# Patient Record
Sex: FEMALE | Race: WHITE | Employment: UNEMPLOYED | URBAN - METROPOLITAN AREA
[De-identification: names, ages, dates, MRNs, and addresses within clinical notes are randomized per-mention and may not be internally consistent; named-entity substitution may affect disease eponyms.]

---
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
## 2021-01-26 ENCOUNTER — OFFICE VISIT (OUTPATIENT)
Dept: FAMILY MEDICINE CLINIC | Facility: CLINIC | Age: 21
End: 2021-01-26
Payer: COMMERCIAL

## 2021-01-26 VITALS
BODY MASS INDEX: 26.07 KG/M2 | DIASTOLIC BLOOD PRESSURE: 76 MMHG | OXYGEN SATURATION: 98 % | HEART RATE: 103 BPM | TEMPERATURE: 98 F | HEIGHT: 69 IN | WEIGHT: 176 LBS | SYSTOLIC BLOOD PRESSURE: 128 MMHG

## 2021-01-26 DIAGNOSIS — F41.0 ANXIETY ATTACK: Primary | ICD-10-CM

## 2021-01-26 PROCEDURE — 99213 OFFICE O/P EST LOW 20 MIN: CPT | Performed by: NURSE PRACTITIONER

## 2021-01-26 PROCEDURE — 3725F SCREEN DEPRESSION PERFORMED: CPT | Performed by: NURSE PRACTITIONER

## 2021-01-26 PROCEDURE — 3008F BODY MASS INDEX DOCD: CPT | Performed by: NURSE PRACTITIONER

## 2021-01-26 RX ORDER — HYDROXYZINE PAMOATE 25 MG/1
25 CAPSULE ORAL 3 TIMES DAILY PRN
Qty: 30 CAPSULE | Refills: 0 | Status: SHIPPED | OUTPATIENT
Start: 2021-01-26 | End: 2021-11-08 | Stop reason: ALTCHOICE

## 2021-01-26 NOTE — PROGRESS NOTES
Assessment/Plan:           Problem List Items Addressed This Visit        Other    Anxiety attack - Primary     Discussed with patient that she appearing to have a panic attack and gave prn vistaril and f/u with therapist          Relevant Medications    hydrOXYzine pamoate (VISTARIL) 25 mg capsule            Subjective:      Patient ID: Marline Martínez is a 24 y o  female  Patient here and reports that she had a panic attack yesterday at work and reports that she was having a good day and then she went in and had her temperature taken and felt hot and her boss was talking with her and she felt anxious and felt hard to breath and stepped away and went to the bathroom and reports that she had to leave and went home and was crying and anxious  Patient took some benadryl and took a while to collect herself  The following portions of the patient's history were reviewed and updated as appropriate:   She  has a past medical history of GERD (gastroesophageal reflux disease) and Medical history reviewed with no changes  She   Patient Active Problem List    Diagnosis Date Noted    Anxiety attack 01/26/2021    Upper respiratory tract infection 11/02/2020    Annual physical exam 05/12/2020     She  has a past surgical history that includes Eye surgery  Her family history includes ETHAN disease in her father; Glaucoma in her mother; Hyperlipidemia in her father and mother  She  reports that she has never smoked  She has never used smokeless tobacco  She reports that she does not drink alcohol or use drugs  Current Outpatient Medications   Medication Sig Dispense Refill    norethindrone-ethinyl estradiol-ferrous fumarate (LOESTIN 24 FE) 1-20 MG-MCG(24) per tablet Take 1 tablet by mouth daily      hydrOXYzine pamoate (VISTARIL) 25 mg capsule Take 1 capsule (25 mg total) by mouth 3 (three) times a day as needed for anxiety for up to 10 days 30 capsule 0     No current facility-administered medications for this visit  She is allergic to lac bovis       Review of Systems   Constitutional: Negative for activity change, appetite change, chills, diaphoresis, fatigue, fever and unexpected weight change  HENT: Negative for congestion, ear pain, hearing loss, postnasal drip, sinus pressure, sinus pain, sneezing and sore throat  Eyes: Negative for pain, redness and visual disturbance  Respiratory: Negative for cough and shortness of breath  Cardiovascular: Negative for chest pain and leg swelling  Gastrointestinal: Negative for abdominal pain, diarrhea, nausea and vomiting  Musculoskeletal: Negative for arthralgias  Neurological: Negative for dizziness and light-headedness  Psychiatric/Behavioral: Negative for behavioral problems and dysphoric mood  The patient is nervous/anxious  Objective:      /76   Pulse 103   Temp 98 °F (36 7 °C)   Ht 5' 9" (1 753 m)   Wt 79 8 kg (176 lb)   SpO2 98%   BMI 25 99 kg/m²          Physical Exam  Vitals signs reviewed  Constitutional:       General: She is not in acute distress  Appearance: She is well-developed  HENT:      Head: Normocephalic and atraumatic  Eyes:      Pupils: Pupils are equal, round, and reactive to light  Neck:      Musculoskeletal: Normal range of motion  Thyroid: No thyromegaly  Cardiovascular:      Rate and Rhythm: Normal rate and regular rhythm  Heart sounds: Normal heart sounds  No murmur  Pulmonary:      Effort: Pulmonary effort is normal  No respiratory distress  Breath sounds: Normal breath sounds  No wheezing  Abdominal:      General: Bowel sounds are normal       Palpations: Abdomen is soft  Musculoskeletal: Normal range of motion  Skin:     General: Skin is warm and dry  Neurological:      Mental Status: She is alert and oriented to person, place, and time

## 2021-01-26 NOTE — PROGRESS NOTES
Assessment/Plan:         There are no diagnoses linked to this encounter  Subjective:      Patient ID: Vy Dash is a 24 y o  female  HPI    The following portions of the patient's history were reviewed and updated as appropriate:   She  has a past medical history of GERD (gastroesophageal reflux disease) and Medical history reviewed with no changes  She   Patient Active Problem List    Diagnosis Date Noted    Upper respiratory tract infection 11/02/2020    Annual physical exam 05/12/2020     She  has a past surgical history that includes Eye surgery  Her family history includes ETHAN disease in her father; Glaucoma in her mother; Hyperlipidemia in her father and mother  She  reports that she has never smoked  She has never used smokeless tobacco  She reports that she does not drink alcohol or use drugs  Current Outpatient Medications   Medication Sig Dispense Refill    norethindrone-ethinyl estradiol-ferrous fumarate (LOESTIN 24 FE) 1-20 MG-MCG(24) per tablet Take 1 tablet by mouth daily       No current facility-administered medications for this visit  She is allergic to lac bovis       Review of Systems      Objective:      /76   Pulse 103   Temp 98 °F (36 7 °C)   Ht 5' 9" (1 753 m)   Wt 79 8 kg (176 lb)   SpO2 98%   BMI 25 99 kg/m²          Physical Exam        BMI Counseling: Body mass index is 25 99 kg/m²  The BMI is above normal  Nutrition recommendations include reducing portion sizes and 3-5 servings of fruits/vegetables daily

## 2021-04-05 ENCOUNTER — TELEMEDICINE (OUTPATIENT)
Dept: FAMILY MEDICINE CLINIC | Facility: CLINIC | Age: 21
End: 2021-04-05
Payer: COMMERCIAL

## 2021-04-05 ENCOUNTER — TELEPHONE (OUTPATIENT)
Dept: FAMILY MEDICINE CLINIC | Facility: CLINIC | Age: 21
End: 2021-04-05

## 2021-04-05 VITALS — BODY MASS INDEX: 26.07 KG/M2 | WEIGHT: 176 LBS | HEIGHT: 69 IN

## 2021-04-05 DIAGNOSIS — U07.1 COVID-19: Primary | ICD-10-CM

## 2021-04-05 PROCEDURE — 99441 PR PHYS/QHP TELEPHONE EVALUATION 5-10 MIN: CPT | Performed by: NURSE PRACTITIONER

## 2021-04-05 NOTE — TELEPHONE ENCOUNTER
Pt calls she tested [facility] yesterday at [facility]  Started showing sx on Thursday 4/1 - she said she was very very tired then progressed from there   She will need paperwork from her job completed,

## 2021-04-05 NOTE — PROGRESS NOTES
COVID-19 Outpatient Progress Note    Assessment/Plan:    Problem List Items Addressed This Visit        Other    COVID-19 - Primary     Positive for covid will touch base again with patient in a few days               Disposition:     I recommended self-quarantine for 10 days and to watch for symptoms until 14 days after exposure  If patient were to develop symptoms, they should self isolate and call our office for further guidance  I have spent 10 minutes directly with the patient  Greater than 50% of this time was spent in counseling/coordination of care regarding: risks and benefits of treatment options, instructions for management, patient and family education, importance of treatment compliance, risk factor reductions and impressions  Encounter provider MIRANDA Vargas    Provider located at [place] [address]  [address] [place] [phone]    Recent Visits  No visits were found meeting these conditions  Showing recent visits within past 7 days and meeting all other requirements     Today's Visits  Date Type Provider Dept   04/05/21 [address] MIRANDA [facility]   04/05/21 Telephone Lucas Hartman   Showing today's visits and meeting all other requirements     Future Appointments  No visits were found meeting these conditions  Showing future appointments within next 150 days and meeting all other requirements        Patient agrees to participate in a virtual check in via telephone or video visit instead of presenting to the office to address urgent/immediate medical needs  Patient is aware this is a billable service  After connecting through Telephone, the patient was identified by name and date of birth  Roscoe Frias was informed that this was a telemedicine visit and that the exam was being conducted confidentially over secure lines  My office door was closed  No one else was in the room   Mónica Pittman acknowledged consent and understanding of privacy and security of the telemedicine visit  I informed the patient that I have reviewed her record in Epic and presented the opportunity for her to ask any questions regarding the visit today  The patient agreed to participate  Subjective:   Amy A Vinton Romberg is a 24 y o  female who is concerned about COVID-19  Patient's symptoms include fever, chills, fatigue, malaise, nasal congestion, cough, myalgias and headache  Patient denies rhinorrhea, sore throat, anosmia, loss of taste, shortness of breath, chest tightness, abdominal pain, nausea, vomiting and diarrhea       Date of symptom onset: 4/1/2021  Date of exposure: 3/28/2021    Exposure:   Contact with a person who is under investigation (PUI) for or who is positive for COVID-19 within the last 14 days?: Yes    Hospitalized recently for fever and/or lower respiratory symptoms?: No      Currently a healthcare worker that is involved in direct patient care?: No      Works in a special setting where the risk of COVID-19 transmission may be high? (this may include long-term care, correctional and shelter facilities; homeless shelters; assisted-living facilities and group homes ): No      Resident in a special setting where the risk of COVID-19 transmission may be high? (this may include long-term care, correctional and shelter facilities; homeless shelters; assisted-living facilities and group homes ): No      Patient called out of work on 4/1/202 1had exposure on 3/28/2021    No results found for: [address] [facility] [address], [address] [facility], [address] [facility] [address]  Past Medical History:   Diagnosis Date    GERD (gastroesophageal reflux disease)     Medical history reviewed with no changes      Past Surgical History:   Procedure Laterality Date    EYE SURGERY      lazy eye     Current Outpatient Medications   Medication Sig Dispense Refill    norethindrone-ethinyl estradiol-ferrous fumarate (LOESTIN 24 FE) 1-20 MG-MCG(24) per tablet Take 1 tablet by mouth daily      hydrOXYzine pamoate (VISTARIL) 25 mg capsule Take 1 capsule (25 mg total) by mouth 3 (three) times a day as needed for anxiety for up to 10 days 30 capsule 0     No current facility-administered medications for this visit  Allergies   Allergen Reactions    Lac Bovis      Mom states hyper sensitive to milk products       Review of Systems   Constitutional: Positive for chills, fatigue and fever  HENT: Positive for congestion  Negative for rhinorrhea and sore throat  Respiratory: Positive for cough  Negative for chest tightness and shortness of breath  Gastrointestinal: Negative for abdominal pain, diarrhea, nausea and vomiting  Musculoskeletal: Positive for myalgias  Neurological: Positive for headaches  Objective:    Vitals:    04/05/21 1139   Weight: 79 8 kg (176 lb)   Height: 5' 9" (1 753 m)       Physical Exam  HENT:      Mouth/Throat:      Mouth: Mucous membranes are moist    Pulmonary:      Effort: Pulmonary effort is normal       Breath sounds: Normal breath sounds  Abdominal:      Palpations: Abdomen is soft  Musculoskeletal: Normal range of motion  Skin:     Capillary Refill: Capillary refill takes less than 2 seconds  Neurological:      Mental Status: She is alert and oriented to person, place, and time  Psychiatric:         Mood and Affect: Mood normal          Behavior: Behavior normal          Thought Content: Thought content normal          Judgment: Judgment normal        VIRTUAL VISIT DISCLAIMER    Mónica Alejandrotavo Bryanito acknowledges that she has consented to an online visit or consultation  She understands that the online visit is based solely on information provided by her, and that, in the absence of a face-to-face physical evaluation by the physician, the diagnosis she receives is both limited and provisional in terms of accuracy and completeness  This is not intended to replace a full medical face-to-face evaluation by the physician   Mónica KILGORE Des Regalado understands and accepts these terms

## 2021-04-08 ENCOUNTER — TELEMEDICINE (OUTPATIENT)
Dept: FAMILY MEDICINE CLINIC | Facility: CLINIC | Age: 21
End: 2021-04-08
Payer: COMMERCIAL

## 2021-04-08 DIAGNOSIS — U07.1 COVID-19: Primary | ICD-10-CM

## 2021-04-08 PROCEDURE — 99441 PR PHYS/QHP TELEPHONE EVALUATION 5-10 MIN: CPT | Performed by: PHYSICIAN ASSISTANT

## 2021-04-08 NOTE — PROGRESS NOTES
COVID-19 Outpatient Progress Note    Assessment/Plan:    Problem List Items Addressed This Visit        Other    COVID-19 - Primary         Disposition:     I recommended continued isolation until at least 24 hours have passed since recovery defined as resolution of fever without the use of fever-reducing medications AND improvement in COVID symptoms AND 10 days have passed since onset of symptoms (or 10 days have passed since date of first positive viral diagnostic test for asymptomatic patients)  I have spent 10 minutes directly with the patient  Encounter provider Adina Laureano PA-C    Provider located at [address]  [address] [place] [phone]    Recent Visits  Date Type Provider Dept   04/05/21 [address], [address]   04/05/21 Telephone Lucas Hartman   Showing recent visits within past 7 days and meeting all other requirements     Future Appointments  No visits were found meeting these conditions  Showing future appointments within next 150 days and meeting all other requirements        Patient agrees to participate in a virtual check in via telephone or video visit instead of presenting to the office to address urgent/immediate medical needs  Patient is aware this is a billable service  After connecting through Telephone, the patient was identified by name and date of birth  Yesica Soni was informed that this was a telemedicine visit and that the exam was being conducted confidentially over secure lines  My office door was closed  Mónica Durán acknowledged consent and understanding of privacy and security of the telemedicine visit  I informed the patient that I have reviewed her record in Epic and presented the opportunity for her to ask any questions regarding the visit today  The patient agreed to participate      It was my intent to perform this visit via video technology but the patient was not able to do a video connection so the visit was completed via audio telephone only  Subjective:   Amy A Vinton Romberg is a 24 y o  female who has been screened for COVID-19  Symptom change since last report: unchanged  Patient's symptoms include nasal congestion  Patient denies cough, shortness of breath and chest tightness  Mónica has been staying home and has isolated themselves in her home  She is taking care to not share personal items and is cleaning all surfaces that are touched often, like counters, tabletops, and doorknobs using household cleaning sprays or wipes  She is wearing a mask when she leaves her room  Date of symptom onset: 4/1/2021  Date of exposure: 3/28/2021  Date of positive COVID-19 PCR: 4/2/2021    25 y/o female day 9 of sx  Patient is at the end of her 10 day isolation period tomorrow  She has much improvement  Continues loss of smell and nasal congestion  Recommend patient to take OTC mucinex, flonase nasal spray and an OTC antihistamine  She marilyn SOB or fever  Lab Results   Component Value Date    SARSCOV2 Positive (A) 04/02/2021     Past Medical History:   Diagnosis Date    GERD (gastroesophageal reflux disease)     Medical history reviewed with no changes      Past Surgical History:   Procedure Laterality Date    EYE SURGERY      lazy eye     Current Outpatient Medications   Medication Sig Dispense Refill    hydrOXYzine pamoate (VISTARIL) 25 mg capsule Take 1 capsule (25 mg total) by mouth 3 (three) times a day as needed for anxiety for up to 10 days 30 capsule 0    norethindrone-ethinyl estradiol-ferrous fumarate (LOESTIN 24 FE) 1-20 MG-MCG(24) per tablet Take 1 tablet by mouth daily       No current facility-administered medications for this visit  Allergies   Allergen Reactions    Lac Bovis      Mom states hyper sensitive to milk products       Review of Systems   HENT: Positive for congestion      Respiratory: Negative for cough, chest tightness and shortness of breath  Objective: There were no vitals filed for this visit  Physical Exam  Constitutional:       General: She is not in acute distress  Neurological:      Mental Status: She is alert  VIRTUAL VISIT DISCLAIMER    Mónica Bach acknowledges that she has consented to an online visit or consultation  She understands that the online visit is based solely on information provided by her, and that, in the absence of a face-to-face physical evaluation by the physician, the diagnosis she receives is both limited and provisional in terms of accuracy and completeness  This is not intended to replace a full medical face-to-face evaluation by the physician  Mónica Godoy understands and accepts these terms

## 2021-04-08 NOTE — LETTER
April 8, 2021    Patient: Meri Blake  YOB: 2000  Date of Last Encounter: 4/8/2021      To whom it may concern:     Meri Blake has tested positive for COVID-19 (Coronavirus)  She may return to work on 04/12/2021, which is 10 days from illness onset (provided symptoms are improving) and 24 hours without fever      Sincerely,         Milagros Yung PA-C

## 2021-07-13 ENCOUNTER — OFFICE VISIT (OUTPATIENT)
Dept: FAMILY MEDICINE CLINIC | Facility: CLINIC | Age: 21
End: 2021-07-13
Payer: COMMERCIAL

## 2021-07-13 VITALS
HEIGHT: 69 IN | DIASTOLIC BLOOD PRESSURE: 76 MMHG | TEMPERATURE: 97 F | WEIGHT: 165.6 LBS | HEART RATE: 108 BPM | BODY MASS INDEX: 24.53 KG/M2 | OXYGEN SATURATION: 99 % | SYSTOLIC BLOOD PRESSURE: 124 MMHG

## 2021-07-13 DIAGNOSIS — R19.7 DIARRHEA, UNSPECIFIED TYPE: ICD-10-CM

## 2021-07-13 DIAGNOSIS — F41.0 ANXIETY ATTACK: Primary | ICD-10-CM

## 2021-07-13 PROCEDURE — 99214 OFFICE O/P EST MOD 30 MIN: CPT | Performed by: PHYSICIAN ASSISTANT

## 2021-07-13 PROCEDURE — 3008F BODY MASS INDEX DOCD: CPT | Performed by: PHYSICIAN ASSISTANT

## 2021-07-13 PROCEDURE — 1036F TOBACCO NON-USER: CPT | Performed by: PHYSICIAN ASSISTANT

## 2021-07-13 RX ORDER — FLUOXETINE 10 MG/1
10 CAPSULE ORAL DAILY
Qty: 30 CAPSULE | Refills: 0 | Status: SHIPPED | OUTPATIENT
Start: 2021-07-13 | End: 2021-08-10

## 2021-07-13 RX ORDER — DICYCLOMINE HYDROCHLORIDE 10 MG/1
10 CAPSULE ORAL
Qty: 21 CAPSULE | Refills: 2 | Status: SHIPPED | OUTPATIENT
Start: 2021-07-13 | End: 2021-11-08 | Stop reason: ALTCHOICE

## 2021-07-13 NOTE — PROGRESS NOTES
Assessment/Plan:    No problem-specific Assessment & Plan notes found for this encounter  Problem List Items Addressed This Visit        Other    Anxiety attack - Primary    Relevant Medications    FLUoxetine (PROzac) 10 mg capsule      Other Visit Diagnoses     Diarrhea, unspecified type        Relevant Medications    dicyclomine (Bentyl) 10 mg capsule            Patient tried Vistaril for anxiety without any relief  She continues to have anxiety  Appears to be less depression and more anxiety  She relates this also to having diarrhea  Bentyl as needed for the diarrhea  Will start on Prozac and follow-up in 4 weeks  Discussed medication adverse effects and benefit vs risks  Pt agreed to plan  Continue counseling therapy monthly    Subjective:      Patient ID: Jad Ruby is a 24 y o  female  23 y/o female presents for evaluation of anxiety  Pt reports she has had ongoing anxiety attacks over the last 6 months  Was placed on hydroxyzine without much relief  She reports associated diarrhea  States her stomach gets upset and has an immediate urge to have a bowel movement  She denies any associated abdominal pain, nausea, vomiting or blood in stool  She denies feeling sad  She denies SI/HI  Denies CP or SOB  The following portions of the patient's history were reviewed and updated as appropriate: allergies, current medications, past family history, past social history, past surgical history and problem list     Review of Systems   Constitutional: Negative for chills, fatigue and fever  HENT: Negative for congestion, ear pain, sinus pain, sore throat and trouble swallowing  Eyes: Negative for pain, discharge and redness  Respiratory: Negative for cough, chest tightness, shortness of breath and wheezing  Cardiovascular: Negative for chest pain, palpitations and leg swelling  Gastrointestinal: Negative for abdominal pain, diarrhea, nausea and vomiting     Musculoskeletal: Negative for arthralgias, joint swelling and myalgias  Skin: Negative for rash  Neurological: Negative for dizziness, weakness, numbness and headaches  Objective:      /76 (BP Location: Left arm, Patient Position: Sitting, Cuff Size: Standard)   Pulse (!) 108   Temp (!) 97 °F (36 1 °C)   Ht 5' 9" (1 753 m)   Wt 75 1 kg (165 lb 9 6 oz)   SpO2 99%   BMI 24 45 kg/m²          Physical Exam  Vitals and nursing note reviewed  Constitutional:       General: She is not in acute distress  Appearance: Normal appearance  She is well-developed  Cardiovascular:      Rate and Rhythm: Normal rate and regular rhythm  Pulmonary:      Effort: Pulmonary effort is normal       Breath sounds: Normal breath sounds  Abdominal:      General: Bowel sounds are normal       Palpations: Abdomen is soft  Abdomen is not rigid  Tenderness: There is no abdominal tenderness  There is no right CVA tenderness, left CVA tenderness, guarding or rebound  Negative signs include Stuart's sign and McBurney's sign  Hernia: No hernia is present  Skin:     General: Skin is warm and dry  Psychiatric:         Attention and Perception: Attention normal          Mood and Affect: Mood is anxious  Speech: Speech normal          Behavior: Behavior normal          Thought Content:  Thought content normal          Cognition and Memory: Cognition normal          Judgment: Judgment normal

## 2021-08-10 ENCOUNTER — TELEMEDICINE (OUTPATIENT)
Dept: FAMILY MEDICINE CLINIC | Facility: CLINIC | Age: 21
End: 2021-08-10
Payer: COMMERCIAL

## 2021-08-10 DIAGNOSIS — F41.0 ANXIETY ATTACK: ICD-10-CM

## 2021-08-10 DIAGNOSIS — F41.9 ANXIETY: Primary | ICD-10-CM

## 2021-08-10 PROCEDURE — 99441 PR PHYS/QHP TELEPHONE EVALUATION 5-10 MIN: CPT | Performed by: PHYSICIAN ASSISTANT

## 2021-08-10 RX ORDER — FLUOXETINE 10 MG/1
CAPSULE ORAL
Qty: 30 CAPSULE | Refills: 0 | Status: SHIPPED | OUTPATIENT
Start: 2021-08-10 | End: 2021-09-03

## 2021-08-10 NOTE — PROGRESS NOTES
Virtual Brief Visit    Verification of patient location:    Patient is located in the following state in which I hold an active license [place]      Assessment/Plan:    Problem List Items Addressed This Visit     None      Visit Diagnoses     Anxiety    -  Primary        F/u 4 weeks  Continue Prozac 10mg- discussed increasing to 20mg however patient would like to stay at the 10mg for now  Reason for visit is   Chief Complaint   Patient presents with    Anxiety     Patient stated that she is doing well on Prozac - her anxiety symptoms have slowed down alot - diarrhea is alot better, she has not experienced that in a few weeks   Virtual Brief Visit        Encounter provider Barry Tristan PA-C    Provider located at [place] [address]  [address] [place] [phone]    Recent Visits  No visits were found meeting these conditions  Showing recent visits within past 7 days and meeting all other requirements  Today's Visits  Date Type Provider Dept   08/10/21 Telemedicine Saulo Melody Holliday PA-C [facility] [address] today's visits and meeting all other requirements  Future Appointments  No visits were found meeting these conditions  Showing future appointments within next 150 days and meeting all other requirements       After connecting through telephone, the patient was identified by name and date of birth  Snehal Guzmán was informed that this is a telemedicine visit and that the visit is being conducted through telephone  My office door was closed  No one else was in the room  She acknowledged consent and understanding of privacy and security of the platform  The patient has agreed to participate and understands she can discontinue the visit at any time  Patient is aware this is a billable service  Sarwat Lezama Randall is a 22 y o  female for f/u on anxiety  23 y/o female presents for follow up on anxiety   Patient states overall doing very well on [id number]  Has much improvement on her mood as well as diarrhea and GI issues  She has no complaints today  Past Medical History:   Diagnosis Date    GERD (gastroesophageal reflux disease)     Medical history reviewed with no changes        Past Surgical History:   Procedure Laterality Date    EYE SURGERY      lazy eye       Current Outpatient Medications   Medication Sig Dispense Refill    dicyclomine (Bentyl) 10 mg capsule Take 1 capsule (10 mg total) by mouth 3 (three) times a day before meals 21 capsule 2    FLUoxetine (PROzac) 10 mg capsule Take 1 capsule (10 mg total) by mouth daily 30 capsule 0    hydrOXYzine pamoate (VISTARIL) 25 mg capsule Take 1 capsule (25 mg total) by mouth 3 (three) times a day as needed for anxiety for up to 10 days (Patient not taking: Reported on 7/13/2021) 30 capsule 0    norethindrone-ethinyl estradiol-ferrous fumarate (LOESTIN 24 FE) 1-20 MG-MCG(24) per tablet Take 1 tablet by mouth daily (Patient not taking: Reported on 7/13/2021)       No current facility-administered medications for this visit  Allergies   Allergen Reactions    Lac Bovis      Mom states hyper sensitive to milk products       Review of Systems   Constitutional: Negative for chills, fatigue and fever  HENT: Negative for congestion, ear pain, sinus pain, sore throat and trouble swallowing  Eyes: Negative for pain, discharge and redness  Respiratory: Negative for cough, chest tightness, shortness of breath and wheezing  Cardiovascular: Negative for chest pain, palpitations and leg swelling  Gastrointestinal: Negative for abdominal pain, diarrhea, nausea and vomiting  Musculoskeletal: Negative for arthralgias, joint swelling and myalgias  Skin: Negative for rash  Neurological: Negative for dizziness, weakness, numbness and headaches  Psychiatric/Behavioral: Negative for dysphoric mood and self-injury  The patient is nervous/anxious          There were no vitals filed for this visit  I spent 10 minutes directly with the patient during this visit    VIRTUAL VISIT DISCLAIMER      Mónica Ross verbally agrees to participate in [employer]  Pt is aware that [employer] could be limited without vital signs or the ability to perform a full hands-on physical exam  Mónica Ross understands she or the provider may request at any time to terminate the video visit and request the patient to seek care or treatment in person

## 2021-09-03 ENCOUNTER — TELEMEDICINE (OUTPATIENT)
Dept: FAMILY MEDICINE CLINIC | Facility: CLINIC | Age: 21
End: 2021-09-03
Payer: COMMERCIAL

## 2021-09-03 DIAGNOSIS — F41.9 ANXIETY: Primary | ICD-10-CM

## 2021-09-03 PROCEDURE — 99441 PR PHYS/QHP TELEPHONE EVALUATION 5-10 MIN: CPT | Performed by: PHYSICIAN ASSISTANT

## 2021-09-03 RX ORDER — FLUOXETINE 20 MG/1
20 TABLET, FILM COATED ORAL DAILY
Qty: 30 TABLET | Refills: 2 | Status: SHIPPED | OUTPATIENT
Start: 2021-09-03 | End: 2021-11-08 | Stop reason: ALTCHOICE

## 2021-09-03 NOTE — PROGRESS NOTES
Virtual Brief Visit    Verification of patient location:    Patient is located in the following state in which I hold an active license [place]      Assessment/Plan:    Problem List Items Addressed This Visit     None      Visit Diagnoses     Anxiety    -  Primary    Relevant Medications    FLUoxetine (PROzac) 20 MG tablet                Reason for visit is   Chief Complaint   Patient presents with    Virtual Brief Visit        Encounter provider Payal Colmenares PA-C    Provider located at [place] [address] [place] [id number]    Recent Visits  No visits were found meeting these conditions  Showing recent visits within past 7 days and meeting all other requirements  Today's Visits  Date Type Provider Dept   09/03/21 Telemedicine Suly Holliday PA-C [facility]   Showing today's visits and meeting all other requirements  Future Appointments  No visits were found meeting these conditions  Showing future appointments within next 150 days and meeting all other requirements       After connecting through telephone, the patient was identified by name and date of birth  Belia Richter was informed that this is a telemedicine visit and that the visit is being conducted through telephone  My office door was closed  No one else was in the room  She acknowledged consent and understanding of privacy and security of the platform  The patient has agreed to participate and understands she can discontinue the visit at any time  Patient is aware this is a billable service  Sarwat Nava Malvin is a 22 y o  female presents for anxiety f/u       21 y/o female presents for virtual f/u on anxiety  Pt taking 10mg prozac  States she was doing much better however since our last discussion she moved to [facility]  States has noticed her anxiety has increased however has not settled in yet  Denies SI/HI    Will increase prozac to 20mg   Continue virtual therapy sessions with therapist weekly  Past Medical History:   Diagnosis Date    GERD (gastroesophageal reflux disease)     Medical history reviewed with no changes        Past Surgical History:   Procedure Laterality Date    EYE SURGERY      lazy eye       Current Outpatient Medications   Medication Sig Dispense Refill    dicyclomine (Bentyl) 10 mg capsule Take 1 capsule (10 mg total) by mouth 3 (three) times a day before meals 21 capsule 2    FLUoxetine (PROzac) 20 MG tablet Take 1 tablet (20 mg total) by mouth daily 30 tablet 2    hydrOXYzine pamoate (VISTARIL) 25 mg capsule Take 1 capsule (25 mg total) by mouth 3 (three) times a day as needed for anxiety for up to 10 days (Patient not taking: Reported on 7/13/2021) 30 capsule 0    norethindrone-ethinyl estradiol-ferrous fumarate (LOESTIN 24 FE) 1-20 MG-MCG(24) per tablet Take 1 tablet by mouth daily (Patient not taking: Reported on 7/13/2021)       No current facility-administered medications for this visit  Allergies   Allergen Reactions    Lac Bovis      Mom states hyper sensitive to milk products       Review of Systems   Constitutional: Negative for chills, fatigue and fever  HENT: Negative for congestion, ear pain, sinus pain, sore throat and trouble swallowing  Eyes: Negative for pain, discharge and redness  Respiratory: Negative for cough, chest tightness, shortness of breath and wheezing  Cardiovascular: Negative for chest pain, palpitations and leg swelling  Gastrointestinal: Negative for abdominal pain, diarrhea, nausea and vomiting  Musculoskeletal: Negative for arthralgias, joint swelling and myalgias  Skin: Negative for rash  Neurological: Negative for dizziness, weakness, numbness and headaches  Psychiatric/Behavioral: Negative for dysphoric mood, self-injury and sleep disturbance  The patient is nervous/anxious  There were no vitals filed for this visit        I spent 5 minutes directly with the patient during this visit    VIRTUAL VISIT DISCLAIMER      Amy A Eloy Meigs verbally agrees to participate in [employer]  Pt is aware that [employer] could be limited without vital signs or the ability to perform a full hands-on physical exam  Amy A Eloy Meigs understands she or the provider may request at any time to terminate the video visit and request the patient to seek care or treatment in person

## 2021-11-05 ENCOUNTER — TELEPHONE (OUTPATIENT)
Dept: FAMILY MEDICINE CLINIC | Facility: CLINIC | Age: 21
End: 2021-11-05

## 2021-11-05 ENCOUNTER — TELEPHONE (OUTPATIENT)
Dept: PSYCHIATRY | Facility: CLINIC | Age: 21
End: 2021-11-05

## 2021-11-05 DIAGNOSIS — F41.0 ANXIETY ATTACK: Primary | ICD-10-CM

## 2021-11-05 DIAGNOSIS — F41.9 ANXIETY: ICD-10-CM

## 2021-11-08 ENCOUNTER — OFFICE VISIT (OUTPATIENT)
Dept: FAMILY MEDICINE CLINIC | Facility: CLINIC | Age: 21
End: 2021-11-08
Payer: COMMERCIAL

## 2021-11-08 VITALS
TEMPERATURE: 99.2 F | BODY MASS INDEX: 24.14 KG/M2 | WEIGHT: 163 LBS | DIASTOLIC BLOOD PRESSURE: 78 MMHG | HEART RATE: 92 BPM | SYSTOLIC BLOOD PRESSURE: 132 MMHG | HEIGHT: 69 IN | OXYGEN SATURATION: 98 %

## 2021-11-08 DIAGNOSIS — F41.9 ANXIETY: Primary | ICD-10-CM

## 2021-11-08 DIAGNOSIS — F41.0 ANXIETY ATTACK: ICD-10-CM

## 2021-11-08 PROBLEM — U07.1 COVID-19: Status: RESOLVED | Noted: 2021-04-05 | Resolved: 2021-11-08

## 2021-11-08 PROBLEM — J06.9 UPPER RESPIRATORY TRACT INFECTION: Status: RESOLVED | Noted: 2020-11-02 | Resolved: 2021-11-08

## 2021-11-08 PROBLEM — Z00.00 ANNUAL PHYSICAL EXAM: Status: RESOLVED | Noted: 2020-05-12 | Resolved: 2021-11-08

## 2021-11-08 PROCEDURE — 3725F SCREEN DEPRESSION PERFORMED: CPT | Performed by: NURSE PRACTITIONER

## 2021-11-08 PROCEDURE — 3008F BODY MASS INDEX DOCD: CPT | Performed by: NURSE PRACTITIONER

## 2021-11-08 PROCEDURE — 1036F TOBACCO NON-USER: CPT | Performed by: NURSE PRACTITIONER

## 2021-11-08 PROCEDURE — 99214 OFFICE O/P EST MOD 30 MIN: CPT | Performed by: NURSE PRACTITIONER

## 2021-11-08 RX ORDER — ESCITALOPRAM OXALATE 5 MG/1
5 TABLET ORAL DAILY
Qty: 30 TABLET | Refills: 0 | Status: SHIPPED | OUTPATIENT
Start: 2021-11-08 | End: 2021-11-16 | Stop reason: SDUPTHER

## 2021-11-08 RX ORDER — LORAZEPAM 0.5 MG/1
0.5 TABLET ORAL EVERY 8 HOURS PRN
Qty: 10 TABLET | Refills: 0 | Status: SHIPPED | OUTPATIENT
Start: 2021-11-08 | End: 2021-12-08

## 2021-11-16 ENCOUNTER — OFFICE VISIT (OUTPATIENT)
Dept: PSYCHIATRY | Facility: CLINIC | Age: 21
End: 2021-11-16
Payer: COMMERCIAL

## 2021-11-16 DIAGNOSIS — F41.9 ANXIETY: ICD-10-CM

## 2021-11-16 DIAGNOSIS — F41.0 GENERALIZED ANXIETY DISORDER WITH PANIC ATTACKS: Primary | ICD-10-CM

## 2021-11-16 DIAGNOSIS — F41.1 GENERALIZED ANXIETY DISORDER WITH PANIC ATTACKS: Primary | ICD-10-CM

## 2021-11-16 DIAGNOSIS — F41.0 ANXIETY ATTACK: ICD-10-CM

## 2021-11-16 PROCEDURE — 90792 PSYCH DIAG EVAL W/MED SRVCS: CPT

## 2021-11-16 RX ORDER — BUSPIRONE HYDROCHLORIDE 5 MG/1
5 TABLET ORAL 3 TIMES DAILY
Qty: 90 TABLET | Refills: 0 | Status: SHIPPED | OUTPATIENT
Start: 2021-11-16 | End: 2021-12-15 | Stop reason: SDUPTHER

## 2021-11-16 RX ORDER — ESCITALOPRAM OXALATE 10 MG/1
10 TABLET ORAL DAILY
Qty: 30 TABLET | Refills: 0 | Status: SHIPPED | OUTPATIENT
Start: 2021-11-16 | End: 2021-12-15 | Stop reason: SDUPTHER

## 2021-12-15 DIAGNOSIS — F41.1 GENERALIZED ANXIETY DISORDER WITH PANIC ATTACKS: ICD-10-CM

## 2021-12-15 DIAGNOSIS — F41.0 GENERALIZED ANXIETY DISORDER WITH PANIC ATTACKS: ICD-10-CM

## 2021-12-15 DIAGNOSIS — F41.9 ANXIETY: ICD-10-CM

## 2021-12-15 RX ORDER — ESCITALOPRAM OXALATE 10 MG/1
10 TABLET ORAL DAILY
Qty: 30 TABLET | Refills: 0 | Status: SHIPPED | OUTPATIENT
Start: 2021-12-15 | End: 2022-01-17 | Stop reason: SDUPTHER

## 2021-12-15 RX ORDER — BUSPIRONE HYDROCHLORIDE 5 MG/1
5 TABLET ORAL 3 TIMES DAILY
Qty: 90 TABLET | Refills: 0 | Status: SHIPPED | OUTPATIENT
Start: 2021-12-15 | End: 2022-01-17 | Stop reason: SDUPTHER

## 2021-12-22 ENCOUNTER — TELEMEDICINE (OUTPATIENT)
Dept: PSYCHIATRY | Facility: CLINIC | Age: 21
End: 2021-12-22
Payer: COMMERCIAL

## 2021-12-22 DIAGNOSIS — F41.0 GENERALIZED ANXIETY DISORDER WITH PANIC ATTACKS: Primary | ICD-10-CM

## 2021-12-22 DIAGNOSIS — F41.1 GENERALIZED ANXIETY DISORDER WITH PANIC ATTACKS: Primary | ICD-10-CM

## 2021-12-22 PROCEDURE — 1036F TOBACCO NON-USER: CPT

## 2021-12-22 PROCEDURE — 99213 OFFICE O/P EST LOW 20 MIN: CPT

## 2021-12-22 RX ORDER — ESCITALOPRAM OXALATE 5 MG/1
5 TABLET ORAL DAILY
Qty: 30 TABLET | Refills: 0 | Status: SHIPPED | OUTPATIENT
Start: 2021-12-22 | End: 2022-01-17 | Stop reason: SDUPTHER

## 2022-01-17 DIAGNOSIS — F41.1 GENERALIZED ANXIETY DISORDER WITH PANIC ATTACKS: ICD-10-CM

## 2022-01-17 DIAGNOSIS — F41.9 ANXIETY: ICD-10-CM

## 2022-01-17 DIAGNOSIS — F41.0 GENERALIZED ANXIETY DISORDER WITH PANIC ATTACKS: ICD-10-CM

## 2022-01-17 RX ORDER — ESCITALOPRAM OXALATE 5 MG/1
5 TABLET ORAL DAILY
Qty: 30 TABLET | Refills: 0 | Status: SHIPPED | OUTPATIENT
Start: 2022-01-17 | End: 2022-02-11

## 2022-01-17 RX ORDER — ESCITALOPRAM OXALATE 10 MG/1
10 TABLET ORAL DAILY
Qty: 30 TABLET | Refills: 0 | Status: SHIPPED | OUTPATIENT
Start: 2022-01-17 | End: 2022-02-11

## 2022-01-17 RX ORDER — BUSPIRONE HYDROCHLORIDE 5 MG/1
5 TABLET ORAL 3 TIMES DAILY
Qty: 90 TABLET | Refills: 0 | Status: SHIPPED | OUTPATIENT
Start: 2022-01-17 | End: 2022-02-11

## 2022-02-02 ENCOUNTER — TELEPHONE (OUTPATIENT)
Dept: GASTROENTEROLOGY | Facility: CLINIC | Age: 22
End: 2022-02-02

## 2022-02-02 NOTE — TELEPHONE ENCOUNTER
Spoke with patient  Unable to advise patient has not been seen since 2019  She will follow-up with her PCP for now  If a sooner appointment become available please schedule patient  Thank you

## 2022-02-02 NOTE — TELEPHONE ENCOUNTER
Mary Davey pt-  Patient is experiencing ibs symptoms, nausea/consitpation     She has scheduled a virtual appt  03/08 with Mary Davey     She is also going to reach out to her PCP  Joao Hendricks Please phone [phone] to advise  Joao Hendricks

## 2022-02-10 DIAGNOSIS — F41.9 ANXIETY: ICD-10-CM

## 2022-02-10 DIAGNOSIS — F41.0 GENERALIZED ANXIETY DISORDER WITH PANIC ATTACKS: ICD-10-CM

## 2022-02-10 DIAGNOSIS — F41.1 GENERALIZED ANXIETY DISORDER WITH PANIC ATTACKS: ICD-10-CM

## 2022-02-11 RX ORDER — ESCITALOPRAM OXALATE 10 MG/1
TABLET ORAL
Qty: 30 TABLET | Refills: 0 | Status: SHIPPED | OUTPATIENT
Start: 2022-02-11 | End: 2022-03-15

## 2022-02-11 RX ORDER — ESCITALOPRAM OXALATE 5 MG/1
5 TABLET ORAL DAILY
Qty: 30 TABLET | Refills: 0 | Status: SHIPPED | OUTPATIENT
Start: 2022-02-11 | End: 2022-03-15

## 2022-02-11 RX ORDER — BUSPIRONE HYDROCHLORIDE 5 MG/1
TABLET ORAL
Qty: 90 TABLET | Refills: 0 | Status: SHIPPED | OUTPATIENT
Start: 2022-02-11 | End: 2022-03-15

## 2022-02-23 ENCOUNTER — TELEMEDICINE (OUTPATIENT)
Dept: PSYCHIATRY | Facility: CLINIC | Age: 22
End: 2022-02-23
Payer: COMMERCIAL

## 2022-02-23 DIAGNOSIS — F41.1 GENERALIZED ANXIETY DISORDER WITH PANIC ATTACKS: Primary | ICD-10-CM

## 2022-02-23 DIAGNOSIS — F41.0 GENERALIZED ANXIETY DISORDER WITH PANIC ATTACKS: Primary | ICD-10-CM

## 2022-02-23 DIAGNOSIS — F41.0 ANXIETY ATTACK: ICD-10-CM

## 2022-02-23 PROCEDURE — 99213 OFFICE O/P EST LOW 20 MIN: CPT

## 2022-02-23 PROCEDURE — 1036F TOBACCO NON-USER: CPT

## 2022-02-23 NOTE — PSYCH
Virtual Regular Visit    Verification of patient location:    Patient is located in the following state in which I hold an active license [place]    Problem List Items Addressed This Visit        Other    Anxiety attack      Other Visit Diagnoses     Generalized anxiety disorder with panic attacks    -  Primary             Encounter provider MIRANDA Ramirez    Provider located at   [address] [address] [phone]    Recent Visits  No visits were found meeting these conditions  Showing recent visits within past 7 days and meeting all other requirements  Today's Visits  Date Type Provider Dept   02/23/22 Telemedicine MIRANDA Ramirez  [facility]   Showing today's visits and meeting all other requirements  Future Appointments  No visits were found meeting these conditions  Showing future appointments within next 150 days and meeting all other requirements         The patient was identified by name and date of birth  Diya Harrington was informed that this is a telemedicine visit and that the visit is being conducted through[employer] and patient was informed this is a secure, HIPAA-complaint platform  She agrees to proceed     My office door was closed  No one else was in the room  She acknowledged consent and understanding of privacy and security of the video platform  The patient has agreed to participate and understands they can discontinue the visit at any time  Patient is aware this is a billable service       HPI     Current Outpatient Medications   Medication Sig Dispense Refill    Aurovela 24 FE 1-20 MG-MCG(24) per tablet Take 1 tablet by mouth daily      busPIRone (BUSPAR) 5 mg tablet TAKE 1 TABLET BY MOUTH THREE TIMES A DAY 90 tablet 0    escitalopram (LEXAPRO) 10 mg tablet TAKE 1 TABLET BY MOUTH EVERY DAY 30 tablet 0    escitalopram (LEXAPRO) 5 mg tablet TAKE 1 TABLET (5 MG TOTAL) BY MOUTH DAILY  30 tablet 0    LORazepam (ATIVAN) 0 5 mg tablet Take 1 tablet (0 5 mg total) by mouth every 8 (eight) hours as needed for anxiety 10 tablet 0     No current facility-administered medications for this visit  Review of Systems  Video Exam    There were no vitals filed for this visit  Physical Exam   As a result of this visit, I have referred the patient for further respiratory evaluation  No    I spent 15 minutes directly with the patient during this visit  [address] acknowledges that she has consented to an online visit or consultation  She understands that the online visit is based solely on information provided by her, and that, in the absence of a face-to-face physical evaluation by the physician, the diagnosis she receives is both limited and provisional in terms of accuracy and completeness  This is not intended to replace a full medical face-to-face evaluation by the physician  Mónica Godoy understands and accepts these terms  MEDICATION MANAGEMENT NOTE        [address] [place] [address]    Name and Date of Birth:  Ryder Hunter y o  2000 MRN: [id number]    Date of Visit: February 23, 2022    Allergies   Allergen Reactions    Lac Bovis      Mom states hyper sensitive to milk products     SUBJECTIVE:    Mónica is seen today for a follow up for anxiety and Generalized Anxiety Disorder  She states that she continues to do very well since the last visit  Patient states she is doing extremely well on Lexapro and BuSpar for the management of generalized anxiety with panic attacks  She is happy to report she has not had a panic attack since November where she initially started treatment with provider  Previously experienced them almost on a daily basis  She is extremely pleased with her progress and there is no need for medication adjustments at this time    She has noticed her grades at college have much improved with her anxiety being properly managed  She has no other questions or concerns at this time and denies all side effects from medication  Denies thoughts of self-harm  She denies any side effects from medications  PLAN:    Continue Lexapro 15 mg daily  Continue BuSpar 5 mg t i d  Aware of 24 hour and weekend coverage for urgent situations accessed by calling [facility] main practice number    Diagnoses and all orders for this visit:    Generalized anxiety disorder with panic attacks    Anxiety attack        Current Outpatient Medications on File Prior to Visit   Medication Sig Dispense Refill    Aurovela 24 FE 1-20 MG-MCG(24) per tablet Take 1 tablet by mouth daily      busPIRone (BUSPAR) 5 mg tablet TAKE 1 TABLET BY MOUTH THREE TIMES A DAY 90 tablet 0    escitalopram (LEXAPRO) 10 mg tablet TAKE 1 TABLET BY MOUTH EVERY DAY 30 tablet 0    escitalopram (LEXAPRO) 5 mg tablet TAKE 1 TABLET (5 MG TOTAL) BY MOUTH DAILY  30 tablet 0    LORazepam (ATIVAN) 0 5 mg tablet Take 1 tablet (0 5 mg total) by mouth every 8 (eight) hours as needed for anxiety 10 tablet 0     No current facility-administered medications on file prior to visit  Psychotherapy Provided:     Individual psychotherapy provided: Yes  Supportive counseling provided  HPI ROS Appetite Changes and Sleep:     She reports normal sleep, normal appetite, normal energy level   Denies homicidal ideation, denies suicidal ideation    Review Of Systems:      HPI ROS:               Medication Side Effects:  denies    Depression (10 worst): 0/10    Anxiety (10 worst): 2/10    Safety concerns (SI, HI, etc): Denies SI    Sleep: good    Energy: good    Appetite: good    Weight Change: denies        Mental Status Evaluation:    Appearance Adequate hygiene and grooming   Behavior calm and cooperative   Mood euthymic  Depression Scale - 0 of 10 (0 = No depression)  Anxiety Scale - 2 of 10 (0 = No anxiety)   Speech Normal rate and volume   Affect appropriate   Thought Processes Goal directed and coherent   Thought Content Does not verbalize delusional material   Associations Tightly connected   Perceptual Disturbances Denies hallucinations and does not appear to be responding to internal stimuli   Risk Potential Suicidal/Homicidal Ideation - No evidence of suicidal or homicidal ideation and patient does not verbalize suicidal or homicidal ideation  Risk of Violence - No evidence of risk for violence found on assessment  Risk of Self Mutilation - No evidence of risk for self mutilation found on assessment   Orientation oriented to person, place, time/date and situation   Memory recent and remote memory grossly intact   Consciousness alert and awake   Attention/Concentration attention span and concentration are age appropriate   Insight intact   Judgement intact   Muscle Strength and Gait normal muscle strength and normal muscle tone, normal gait/station and normal balance   Motor Activity no abnormal movements   Language no difficulty naming common objects, no difficulty repeating a phrase, no difficulty writing a sentence   Fund of Knowledge adequate knowledge of current events  adequate fund of knowledge regarding past history  adequate fund of knowledge regarding vocabulary      Past Psychiatric History Update:     Inpatient Psychiatric Admission Since Last Encounter:   no  Changes to Outpatient Psychiatric Treatment Team:    no  Suicide Attempt Or Self Mutilation Since Last Encounter:   no  Incidence of Violent Behavior Since Last Encounter:   no    Traumatic History Update:     New Onset of Abuse Since Last Encounter:   no  Traumatic Events Since Last Encounter:   no    Past Medical History:    Past Medical History:   Diagnosis Date    GERD (gastroesophageal reflux disease)     Medical history reviewed with no changes      No past medical history pertinent negatives    Past Surgical History:   Procedure Laterality Date    EYE SURGERY lazy eye     Allergies   Allergen Reactions    Lac Bovis      Mom states hyper sensitive to milk products     Substance Abuse History:    Social History     Substance and Sexual Activity   Alcohol Use No     Social History     Substance and Sexual Activity   Drug Use Never     Social History:    Social History     Socioeconomic History    Marital status: Single     Spouse name: Not on file    Number of children: Not on file    Years of education: Not on file    Highest education level: Not on file   Occupational History    Not on file   Tobacco Use    Smoking status: Never Smoker    Smokeless tobacco: Never Used   Substance and Sexual Activity    Alcohol use: No    Drug use: Never    Sexual activity: Not on file   Other Topics Concern    Not on file   Social History Narrative    Denied:  Caffeine use     Social Determinants of Health     Financial Resource Strain: Not on file   Food Insecurity: Not on file   Transportation Needs: Not on file   Physical Activity: Not on file   Stress: Not on file   Social Connections: Not on file   Intimate Partner Violence: Not on file   Housing Stability: Not on file     Family Psychiatric History:     Family History   Problem Relation Age of Onset    Glaucoma Mother     Hyperlipidemia Mother         hypercholesterolemia    ETHAN disease Father     Hyperlipidemia Father         hypercholesterolemia     History Review: The following portions of the patient's history were reviewed and updated as appropriate: allergies, current medications, past family history, past medical history, past social history, past surgical history and problem list     OBJECTIVE:     Vital signs in last 24 hours: There were no vitals filed for this visit  Laboratory Results: I have personally reviewed all pertinent laboratory/tests results      Suicide/Homicide Risk Assessment:    Risk of Harm to Self:  Protective Factors: no current suicidal ideation, access to mental health treatment, compliant with medications, compliant with mental health treatment  Based on today's assessment, Mónica presents the following risk of harm to self: none    Risk of Harm to Others:  Based on today's assessment, Mónica presents the following risk of harm to others: none    The following interventions are recommended: no intervention changes needed    Medications Risks/Benefits:      Risks, Benefits And Possible Side Effects Of Medications:    Discussed risks and benefits of treatment with patient including risk of suicidality, serotonin syndrome, increased QTc interval and SIADH related to treatment with antidepressants; Risk of induction of manic symptoms in certain patient populations     Controlled Medication Discussion:     Not applicable    Treatment Plan:    Due for update/Updated:   no  Last treatment plan done 11/16/21 by Cara Shepherd  Treatment Plan due on 5/16/22  MIRANDA Lockhart 02/23/22    This note was shared with patient

## 2022-03-08 ENCOUNTER — TELEMEDICINE (OUTPATIENT)
Dept: GASTROENTEROLOGY | Facility: CLINIC | Age: 22
End: 2022-03-08
Payer: COMMERCIAL

## 2022-03-08 DIAGNOSIS — R11.0 NAUSEA: ICD-10-CM

## 2022-03-08 DIAGNOSIS — R10.30 LOWER ABDOMINAL PAIN: ICD-10-CM

## 2022-03-08 DIAGNOSIS — R10.13 EPIGASTRIC PAIN: Primary | ICD-10-CM

## 2022-03-08 PROCEDURE — 1036F TOBACCO NON-USER: CPT | Performed by: PHYSICIAN ASSISTANT

## 2022-03-08 PROCEDURE — 99213 OFFICE O/P EST LOW 20 MIN: CPT | Performed by: PHYSICIAN ASSISTANT

## 2022-03-08 RX ORDER — DICYCLOMINE HCL 20 MG
20 TABLET ORAL EVERY 6 HOURS
Qty: 60 TABLET | Refills: 3 | Status: SHIPPED | OUTPATIENT
Start: 2022-03-08

## 2022-03-08 RX ORDER — PANTOPRAZOLE SODIUM 40 MG/1
40 TABLET, DELAYED RELEASE ORAL DAILY
Qty: 30 TABLET | Refills: 3 | Status: SHIPPED | OUTPATIENT
Start: 2022-03-08 | End: 2022-07-12

## 2022-03-08 NOTE — PROGRESS NOTES
Virtual Regular Visit    Verification of patient location:    Patient is located in the following state in which I hold an active license [place]      Assessment/Plan:    Problem List Items Addressed This Visit     None      Visit Diagnoses     Epigastric pain    -  Primary    Relevant Medications    pantoprazole (PROTONIX) 40 mg tablet    dicyclomine (BENTYL) 20 mg tablet    Nausea        Relevant Medications    pantoprazole (PROTONIX) 40 mg tablet    dicyclomine (BENTYL) 20 mg tablet    Lower abdominal pain        Relevant Medications    pantoprazole (PROTONIX) 40 mg tablet    dicyclomine (BENTYL) 20 mg tablet          1  Epigastric pain  2  Nausea  3  Lower abdominal pain  -Will start patient on pantoprazole 40 mg daily   -Will start Align and probiotic   -Will start Bentyl 20mg TID prn      -Will hold off on any GI procedures at this time  Reason for visit is   Chief Complaint   Patient presents with    Virtual Regular Visit        Encounter provider Terrance Irvin PA-C    Provider located at [address] [facility] [address] [facility] [address] [facility] [address] [phone]      Recent Visits  No visits were found meeting these conditions  Showing recent visits within past 7 days and meeting all other requirements  Today's Visits  Date Type Provider Dept   03/08/22 Telemedicine [facility] [address] today's visits and meeting all other requirements  Future Appointments  No visits were found meeting these conditions  Showing future appointments within next 150 days and meeting all other requirements       The patient was identified by name and date of birth  Derian Kumar was informed that this is a telemedicine visit and that the visit is being conducted through Telephone  My office door was closed  No one else was in the room  She acknowledged consent and understanding of privacy and security of the video platform   The patient has agreed to participate and understands they can discontinue the visit at any time  Patient is aware this is a billable service  Sarwat Sales MAGUI Jones is a 22 y o  female 70-year-old female who presents today for GI follow-up  Patient reports has been quite some time she has followed up with ALVIN FOSTER  She reports that she is having upper abdominal discomfort with associated nausea and lower abdominal cramping  Patient reports she has also alternating between diarrhea and constipation  Patient did go to the emergency department with these symptoms and workup was essentially negative  Patient has had an upper endoscopy and gallbladder workup in the past   Patient is really not taking anything consistently right now for her abdominal symptoms  Patient denies any alarm symptoms  [place] OATES     Past Medical History:   Diagnosis Date    GERD (gastroesophageal reflux disease)     Medical history reviewed with no changes        Past Surgical History:   Procedure Laterality Date    EYE SURGERY      lazy eye       Current Outpatient Medications   Medication Sig Dispense Refill    Aurovela 24 FE 1-20 MG-MCG(24) per tablet Take 1 tablet by mouth daily      busPIRone (BUSPAR) 5 mg tablet TAKE 1 TABLET BY MOUTH THREE TIMES A DAY 90 tablet 0    dicyclomine (BENTYL) 20 mg tablet Take 1 tablet (20 mg total) by mouth every 6 (six) hours 60 tablet 3    escitalopram (LEXAPRO) 10 mg tablet TAKE 1 TABLET BY MOUTH EVERY DAY 30 tablet 0    escitalopram (LEXAPRO) 5 mg tablet TAKE 1 TABLET (5 MG TOTAL) BY MOUTH DAILY  30 tablet 0    LORazepam (ATIVAN) 0 5 mg tablet Take 1 tablet (0 5 mg total) by mouth every 8 (eight) hours as needed for anxiety 10 tablet 0    pantoprazole (PROTONIX) 40 mg tablet Take 1 tablet (40 mg total) by mouth daily 30 tablet 3     No current facility-administered medications for this visit          Allergies   Allergen Reactions    Lac Bovis      Mom states hyper sensitive to milk products Review of Systems    Video Exam    There were no vitals filed for this visit  Physical Exam     I spent 15 minutes with patient today in which greater than 50% of the time was spent in counseling/coordination of care regarding abdominal pain    VIRTUAL VISIT DISCLAIMER      Mónica Godoy verbally agrees to participate in [employer]  Pt is aware that [employer] could be limited without vital signs or the ability to perform a full hands-on physical exam  Mónica Davis understands she or the provider may request at any time to terminate the video visit and request the patient to seek care or treatment in person

## 2022-03-08 NOTE — LETTER
March 8, 2022     Mei Ge, [address]    Patient: Asher Gupta   YOB: 2000   Date of Visit: 3/8/2022       Dear Dr Rosario Acosta: Thank you for referring Talon Ward to me for evaluation  Below are my notes for this consultation  If you have questions, please do not hesitate to call me  I look forward to following your patient along with you  Sincerely,        Merlin Simon PA-C        CC: No Recipients  Yoanna Paz  3/8/2022  3:38 PM  Sign when Signing Visit    Virtual Regular Visit    Verification of patient location:    Patient is located in the following state in which I hold an active license [place]      Assessment/Plan:    Problem List Items Addressed This Visit     None      Visit Diagnoses     Epigastric pain    -  Primary    Relevant Medications    pantoprazole (PROTONIX) 40 mg tablet    dicyclomine (BENTYL) 20 mg tablet    Nausea        Relevant Medications    pantoprazole (PROTONIX) 40 mg tablet    dicyclomine (BENTYL) 20 mg tablet    Lower abdominal pain        Relevant Medications    pantoprazole (PROTONIX) 40 mg tablet    dicyclomine (BENTYL) 20 mg tablet          1  Epigastric pain  2  Nausea  3  Lower abdominal pain  -Will start patient on pantoprazole 40 mg daily   -Will start Align and probiotic   -Will start Bentyl 20mg TID prn      -Will hold off on any GI procedures at this time  Reason for visit is   Chief Complaint   Patient presents with    Virtual Regular Visit        Encounter provider Merlin Simon PA-C    Provider located at [address] [facility] [address] [facility] [address] [facility] [address] [phone]      Recent Visits  No visits were found meeting these conditions    Showing recent visits within past 7 days and meeting all other requirements  Today's Visits  Date Type Provider Dept   03/08/22 Telemedicine Merlin Simon PA-C [facility]   Showing today's visits and meeting all other requirements  Future Appointments  No visits were found meeting these conditions  Showing future appointments within next 150 days and meeting all other requirements       The patient was identified by name and date of birth  Denisse Vera was informed that this is a telemedicine visit and that the visit is being conducted through Telephone  My office door was closed  No one else was in the room  She acknowledged consent and understanding of privacy and security of the video platform  The patient has agreed to participate and understands they can discontinue the visit at any time  Patient is aware this is a billable service  Sarwat Donaldson Mikala is a 22 y o  female 26-year-old female who presents today for GI follow-up  Patient reports has been quite some time she has followed up with ALVIN FOSTER  She reports that she is having upper abdominal discomfort with associated nausea and lower abdominal cramping  Patient reports she has also alternating between diarrhea and constipation  Patient did go to the emergency department with these symptoms and workup was essentially negative  Patient has had an upper endoscopy and gallbladder workup in the past   Patient is really not taking anything consistently right now for her abdominal symptoms  Patient denies any alarm symptoms  Connor OATES     Past Medical History:   Diagnosis Date    GERD (gastroesophageal reflux disease)     Medical history reviewed with no changes        Past Surgical History:   Procedure Laterality Date    EYE SURGERY      lazy eye       Current Outpatient Medications   Medication Sig Dispense Refill    Aurovela 24 FE 1-20 MG-MCG(24) per tablet Take 1 tablet by mouth daily      busPIRone (BUSPAR) 5 mg tablet TAKE 1 TABLET BY MOUTH THREE TIMES A DAY 90 tablet 0    dicyclomine (BENTYL) 20 mg tablet Take 1 tablet (20 mg total) by mouth every 6 (six) hours 60 tablet 3    escitalopram (LEXAPRO) 10 mg tablet TAKE 1 TABLET BY MOUTH EVERY DAY 30 tablet 0    escitalopram (LEXAPRO) 5 mg tablet TAKE 1 TABLET (5 MG TOTAL) BY MOUTH DAILY  30 tablet 0    LORazepam (ATIVAN) 0 5 mg tablet Take 1 tablet (0 5 mg total) by mouth every 8 (eight) hours as needed for anxiety 10 tablet 0    pantoprazole (PROTONIX) 40 mg tablet Take 1 tablet (40 mg total) by mouth daily 30 tablet 3     No current facility-administered medications for this visit  Allergies   Allergen Reactions    Lac Bovis      Mom states hyper sensitive to milk products       Review of Systems    Video Exam    There were no vitals filed for this visit  Physical Exam     I spent 15 minutes with patient today in which greater than 50% of the time was spent in counseling/coordination of care regarding abdominal pain    VIRTUAL VISIT DISCLAIMER      Mónica Godoy verbally agrees to participate in [employer]  Pt is aware that [employer] could be limited without vital signs or the ability to perform a full hands-on physical exam  Mónica Santillan understands she or the provider may request at any time to terminate the video visit and request the patient to seek care or treatment in person

## 2022-03-08 NOTE — PATIENT INSTRUCTIONS
Abdominal Pain   WHAT YOU NEED TO KNOW:   Abdominal pain can be dull, achy, or sharp  You may have pain in one area of your abdomen, or in your entire abdomen  Your pain may be caused by a condition such as constipation, food sensitivity or poisoning, infection, or a blockage  Abdominal pain can also be from a hernia, appendicitis, or an ulcer  Liver, gallbladder, or kidney conditions can also cause abdominal pain  The cause of your abdominal pain may not be known  DISCHARGE INSTRUCTIONS:   Call your local emergency number (911 in the [address] [facility]) if:   · You have new chest pain or shortness of breath  Return to the emergency department if:   · You have pulsing pain in your upper abdomen or lower back that suddenly becomes constant  · Your pain is in the right lower abdominal area and worsens with movement  · You have a fever over 100 4°F (38°C) or shaking chills  · You are vomiting and cannot keep food or liquids down  · Your pain does not improve or gets worse over the next 8 to 12 hours  · You see blood in your vomit or bowel movements, or they look black and tarry  · Your skin or the whites of your eyes turn yellow  · You are a woman and have a large amount of vaginal bleeding that is not your monthly period  Call your doctor if:   · You have pain in your lower back  · You are a man and have pain in your testicles  · You have pain when you urinate  · You have questions or concerns about your condition or care  Medicines:   · Prescription pain medicine  may be given  Ask your healthcare provider how to take this medicine safely  Some prescription pain medicines contain acetaminophen  Do not take other medicines that contain acetaminophen without talking to your healthcare provider  Too much acetaminophen may cause liver damage  Prescription pain medicine may cause constipation  Ask your healthcare provider how to prevent or treat constipation       · Medicines  may be given to calm your stomach or prevent vomiting  · Take your medicine as directed  Contact your healthcare provider if you think your medicine is not helping or if you have side effects  Tell him of her if you are allergic to any medicine  Keep a list of the medicines, vitamins, and herbs you take  Include the amounts, and when and why you take them  Bring the list or the pill bottles to follow-up visits  Carry your medicine list with you in case of an emergency  Manage your symptoms:   · Apply heat  on your abdomen for 20 to 30 minutes every 2 hours for as many days as directed  Heat helps decrease pain and muscle spasms  · Make changes to the food you eat, if needed  Do not eat foods that cause abdominal pain or other symptoms  Eat small meals more often  The following changes may also help:    ? Eat more high-fiber foods if you are constipated  High-fiber foods include fruits, vegetables, whole-grain foods, and legumes  ? Do not eat foods that cause gas if you have bloating  Examples include broccoli, cabbage, and cauliflower  Do not drink soda or carbonated drinks  These may also cause gas  ? Do not eat foods or drinks that contain sorbitol or fructose if you have diarrhea and bloating  Some examples are fruit juices, candy, jelly, and sugar-free gum  ? Do not eat high-fat foods  Examples include fried foods, cheeseburgers, hot dogs, and desserts  ? Limit or do not have caffeine  Caffeine may make symptoms such as heartburn or nausea worse  ? Drink more liquids to prevent dehydration from diarrhea or vomiting  Ask your healthcare provider how much liquid to drink each day and which liquids are best for you  · Keep a diary of your abdominal pain  A diary may help your healthcare provider learn what is causing your abdominal pain  Include when the pain happens, how long it lasts, and what the pain feels like  Write down any other symptoms you have with abdominal pain   Also write down what you eat, and what symptoms you have after you eat  · Manage your stress  Stress may cause abdominal pain  Your healthcare provider may recommend relaxation techniques and deep breathing exercises to help decrease your stress  Your healthcare provider may recommend you talk to someone about your stress or anxiety, such as a counselor or a trusted friend  Get plenty of sleep and exercise regularly  · Limit or do not drink alcohol  Alcohol can make your abdominal pain worse  Ask your healthcare provider if it is safe for you to drink alcohol  Also ask how much is safe for you to drink  · Do not smoke  Nicotine and other chemicals in cigarettes can damage your esophagus and stomach  Ask your healthcare provider for information if you currently smoke and need help to quit  E-cigarettes or smokeless tobacco still contain nicotine  Talk to your healthcare provider before you use these products  Follow up with your doctor within 24 hours or as directed:  Write down your questions so you remember to ask them during your visits  © [employer] 2022 Information is for End User's use only and may not be sold, redistributed or otherwise used for commercial purposes  All illustrations and images included in CareNotes® are the copyrighted property of [employer]  or [facility]   The above information is an  only  It is not intended as medical advice for individual conditions or treatments  Talk to your doctor, nurse or pharmacist before following any medical regimen to see if it is safe and effective for you

## 2022-03-15 ENCOUNTER — TELEPHONE (OUTPATIENT)
Dept: PSYCHIATRY | Facility: CLINIC | Age: 22
End: 2022-03-15

## 2022-03-15 DIAGNOSIS — F41.0 GENERALIZED ANXIETY DISORDER WITH PANIC ATTACKS: ICD-10-CM

## 2022-03-15 DIAGNOSIS — F41.9 ANXIETY: ICD-10-CM

## 2022-03-15 DIAGNOSIS — F41.1 GENERALIZED ANXIETY DISORDER WITH PANIC ATTACKS: ICD-10-CM

## 2022-03-15 RX ORDER — ESCITALOPRAM OXALATE 10 MG/1
TABLET ORAL
Qty: 30 TABLET | Refills: 0 | Status: SHIPPED | OUTPATIENT
Start: 2022-03-15 | End: 2022-04-13

## 2022-03-15 RX ORDER — BUSPIRONE HYDROCHLORIDE 5 MG/1
TABLET ORAL
Qty: 90 TABLET | Refills: 0 | Status: SHIPPED | OUTPATIENT
Start: 2022-03-15 | End: 2022-04-13

## 2022-03-15 RX ORDER — ESCITALOPRAM OXALATE 5 MG/1
5 TABLET ORAL DAILY
Qty: 30 TABLET | Refills: 0 | Status: SHIPPED | OUTPATIENT
Start: 2022-03-15 | End: 2022-04-13

## 2022-03-22 ENCOUNTER — TELEPHONE (OUTPATIENT)
Dept: GASTROENTEROLOGY | Facility: CLINIC | Age: 22
End: 2022-03-22

## 2022-03-22 NOTE — TELEPHONE ENCOUNTER
----- Message from Mónica Amaro sent at 3/21/2022  9:00 PM EDT -----  Regarding: Lindsey  Is there anyway that I can get a referral to a doctor in [address], I am still feeling pretty awful and would like to see a doctor in person

## 2022-04-13 DIAGNOSIS — F41.1 GENERALIZED ANXIETY DISORDER WITH PANIC ATTACKS: ICD-10-CM

## 2022-04-13 DIAGNOSIS — F41.0 GENERALIZED ANXIETY DISORDER WITH PANIC ATTACKS: ICD-10-CM

## 2022-04-13 DIAGNOSIS — F41.9 ANXIETY: ICD-10-CM

## 2022-04-13 RX ORDER — BUSPIRONE HYDROCHLORIDE 5 MG/1
TABLET ORAL
Qty: 90 TABLET | Refills: 0 | Status: SHIPPED | OUTPATIENT
Start: 2022-04-13 | End: 2022-05-20

## 2022-04-13 RX ORDER — ESCITALOPRAM OXALATE 10 MG/1
TABLET ORAL
Qty: 30 TABLET | Refills: 0 | Status: SHIPPED | OUTPATIENT
Start: 2022-04-13 | End: 2022-05-20

## 2022-04-13 RX ORDER — ESCITALOPRAM OXALATE 5 MG/1
5 TABLET ORAL DAILY
Qty: 30 TABLET | Refills: 0 | Status: SHIPPED | OUTPATIENT
Start: 2022-04-13 | End: 2022-05-20

## 2022-05-20 DIAGNOSIS — F41.1 GENERALIZED ANXIETY DISORDER WITH PANIC ATTACKS: ICD-10-CM

## 2022-05-20 DIAGNOSIS — F41.9 ANXIETY: ICD-10-CM

## 2022-05-20 DIAGNOSIS — F41.0 GENERALIZED ANXIETY DISORDER WITH PANIC ATTACKS: ICD-10-CM

## 2022-05-20 RX ORDER — ESCITALOPRAM OXALATE 5 MG/1
5 TABLET ORAL DAILY
Qty: 30 TABLET | Refills: 0 | Status: SHIPPED | OUTPATIENT
Start: 2022-05-20 | End: 2022-06-17 | Stop reason: SDUPTHER

## 2022-05-20 RX ORDER — ESCITALOPRAM OXALATE 10 MG/1
TABLET ORAL
Qty: 30 TABLET | Refills: 0 | Status: SHIPPED | OUTPATIENT
Start: 2022-05-20 | End: 2022-06-17 | Stop reason: SDUPTHER

## 2022-05-20 RX ORDER — BUSPIRONE HYDROCHLORIDE 5 MG/1
TABLET ORAL
Qty: 90 TABLET | Refills: 0 | Status: SHIPPED | OUTPATIENT
Start: 2022-05-20 | End: 2022-06-27

## 2022-06-09 ENCOUNTER — TELEPHONE (OUTPATIENT)
Dept: PSYCHIATRY | Facility: CLINIC | Age: 22
End: 2022-06-09

## 2022-06-09 NOTE — TELEPHONE ENCOUNTER
Behavorial Health Outpatient Intake Questions    Referred by:Dom [place] Nuha    Please advised interviewee that they need to answer all questions truthfully to allow for best care and any misrepresentations of information may affect their ability to be seen at this clinic   => Was this discussed? Yes     [facility]     Presenting Problem (in patient's words): Anxiety    Are there any developmental disabilities? ? If yes, can they speak to you on the phone? If they are too limited to speak to you on phone, refer out No    Are you taking any psychiatric medications? Yes    => If yes, who prescribes? If yes, are they injectable medications? Lexapro    Does the patient have a language barrier or hearing impairment? No    Have you been treated at [address] [place] [address] by a therapist or a doctor in the past? If yes, who? No    Has the patient been hospitalized for mental health? No   If yes, how long ago was last hospitalization and where was it? Do you actively use alcohol or marijuana or illegal substances? If yes, what and how much - refer out to Drug and alcohol treatment if use is excessive or daily use of illegal substances No concerns of substance abuse are reported  Do you have a community treatment team or ? No    Legal History-     Does the patient have any history of arrests, [facility]/group home time, or DUIs? No  If Yes-  1) What types of charges? 2) When were they last incarcerated? 3) Are they currently on parole or probation? Minor Child-    Who has custody of the child? Is there a custody agreement? If there is a custody agreement remind parent that they must bring a copy to the first appt or they will not be seen       Intake Team, please check with provider before scheduling if flags come up such as:  - complex case  - legal history (other than DUI)  - communication barrier concerns are present  - if, in your judgment, this needs further review    ACCEPTED as a patient Yes  => Appointment Date: 6/23/2022 with Tanvir Gibson    Referred Elsewhere? No    Name of [address] OP#[phone]  Insurance Phone #  If ins is primary or secondary:Primary  If patient is a minor, parents information such as Name, D  O B of guarantor

## 2022-06-15 ENCOUNTER — CLINICAL SUPPORT (OUTPATIENT)
Dept: FAMILY MEDICINE CLINIC | Facility: CLINIC | Age: 22
End: 2022-06-15
Payer: COMMERCIAL

## 2022-06-15 DIAGNOSIS — N30.00 ACUTE CYSTITIS WITHOUT HEMATURIA: Primary | ICD-10-CM

## 2022-06-15 DIAGNOSIS — R30.0 DYSURIA: Primary | ICD-10-CM

## 2022-06-15 LAB
SL AMB  POCT GLUCOSE, UA: NEGATIVE
SL AMB LEUKOCYTE ESTERASE,UA: ABNORMAL
SL AMB POCT BILIRUBIN,UA: NEGATIVE
SL AMB POCT BLOOD,UA: NEGATIVE
SL AMB POCT CLARITY,UA: CLEAR
SL AMB POCT COLOR,UA: YELLOW
SL AMB POCT KETONES,UA: NEGATIVE
SL AMB POCT NITRITE,UA: NEGATIVE
SL AMB POCT PH,UA: 7
SL AMB POCT SPECIFIC GRAVITY,UA: 1.02
SL AMB POCT URINE PROTEIN: NEGATIVE
SL AMB POCT UROBILINOGEN: 0.2

## 2022-06-15 PROCEDURE — 81003 URINALYSIS AUTO W/O SCOPE: CPT | Performed by: FAMILY MEDICINE

## 2022-06-15 PROCEDURE — 87086 URINE CULTURE/COLONY COUNT: CPT | Performed by: NURSE PRACTITIONER

## 2022-06-15 RX ORDER — SULFAMETHOXAZOLE AND TRIMETHOPRIM 800; 160 MG/1; MG/1
1 TABLET ORAL 2 TIMES DAILY
Qty: 6 TABLET | Refills: 0 | Status: SHIPPED | OUTPATIENT
Start: 2022-06-15 | End: 2022-06-18

## 2022-06-16 LAB — BACTERIA UR CULT: NORMAL

## 2022-06-17 DIAGNOSIS — F41.1 GENERALIZED ANXIETY DISORDER WITH PANIC ATTACKS: ICD-10-CM

## 2022-06-17 DIAGNOSIS — F41.9 ANXIETY: ICD-10-CM

## 2022-06-17 DIAGNOSIS — F41.0 GENERALIZED ANXIETY DISORDER WITH PANIC ATTACKS: ICD-10-CM

## 2022-06-17 RX ORDER — ESCITALOPRAM OXALATE 10 MG/1
10 TABLET ORAL DAILY
Qty: 30 TABLET | Refills: 0 | Status: SHIPPED | OUTPATIENT
Start: 2022-06-17 | End: 2022-06-21 | Stop reason: SDUPTHER

## 2022-06-17 RX ORDER — ESCITALOPRAM OXALATE 5 MG/1
5 TABLET ORAL DAILY
Qty: 30 TABLET | Refills: 0 | Status: SHIPPED | OUTPATIENT
Start: 2022-06-17 | End: 2022-06-21 | Stop reason: SDUPTHER

## 2022-06-21 DIAGNOSIS — F41.1 GENERALIZED ANXIETY DISORDER WITH PANIC ATTACKS: ICD-10-CM

## 2022-06-21 DIAGNOSIS — F41.9 ANXIETY: ICD-10-CM

## 2022-06-21 DIAGNOSIS — F41.0 GENERALIZED ANXIETY DISORDER WITH PANIC ATTACKS: ICD-10-CM

## 2022-06-21 NOTE — TELEPHONE ENCOUNTER
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

## 2022-06-22 DIAGNOSIS — F41.1 GENERALIZED ANXIETY DISORDER WITH PANIC ATTACKS: ICD-10-CM

## 2022-06-22 DIAGNOSIS — F41.0 GENERALIZED ANXIETY DISORDER WITH PANIC ATTACKS: ICD-10-CM

## 2022-06-23 ENCOUNTER — TELEPHONE (OUTPATIENT)
Dept: PSYCHIATRY | Facility: CLINIC | Age: 22
End: 2022-06-23

## 2022-06-23 NOTE — TELEPHONE ENCOUNTER
Clinician has 12:30pm session that Mónica was a NS to   Clinician called at 1240 pm to assure that there were not technical issues preventing Mónica from getting into session  Clinician left a voicemail for Mónica to call office to reschedule session

## 2022-06-27 RX ORDER — BUSPIRONE HYDROCHLORIDE 5 MG/1
TABLET ORAL
Qty: 270 TABLET | Refills: 1 | Status: SHIPPED | OUTPATIENT
Start: 2022-06-27 | End: 2022-07-14 | Stop reason: DRUGHIGH

## 2022-06-27 RX ORDER — ESCITALOPRAM OXALATE 5 MG/1
5 TABLET ORAL DAILY
Qty: 30 TABLET | Refills: 0 | Status: SHIPPED | OUTPATIENT
Start: 2022-06-27 | End: 2022-07-14 | Stop reason: DRUGHIGH

## 2022-06-27 RX ORDER — ESCITALOPRAM OXALATE 10 MG/1
10 TABLET ORAL DAILY
Qty: 30 TABLET | Refills: 0 | Status: SHIPPED | OUTPATIENT
Start: 2022-06-27 | End: 2022-07-14 | Stop reason: SDUPTHER

## 2022-07-05 PROBLEM — K21.9 GERD (GASTROESOPHAGEAL REFLUX DISEASE): Status: ACTIVE | Noted: 2022-07-05

## 2022-07-05 PROBLEM — K58.9 IBS (IRRITABLE BOWEL SYNDROME): Status: ACTIVE | Noted: 2022-07-05

## 2022-07-12 DIAGNOSIS — R11.0 NAUSEA: ICD-10-CM

## 2022-07-12 DIAGNOSIS — R10.13 EPIGASTRIC PAIN: ICD-10-CM

## 2022-07-12 DIAGNOSIS — R10.30 LOWER ABDOMINAL PAIN: ICD-10-CM

## 2022-07-12 RX ORDER — PANTOPRAZOLE SODIUM 40 MG/1
TABLET, DELAYED RELEASE ORAL
Qty: 90 TABLET | Refills: 2 | Status: SHIPPED | OUTPATIENT
Start: 2022-07-12

## 2022-07-14 ENCOUNTER — TELEMEDICINE (OUTPATIENT)
Dept: PSYCHIATRY | Facility: CLINIC | Age: 22
End: 2022-07-14
Payer: COMMERCIAL

## 2022-07-14 DIAGNOSIS — F41.9 ANXIETY: ICD-10-CM

## 2022-07-14 DIAGNOSIS — F41.1 GENERALIZED ANXIETY DISORDER WITH PANIC ATTACKS: Primary | ICD-10-CM

## 2022-07-14 DIAGNOSIS — F41.0 GENERALIZED ANXIETY DISORDER WITH PANIC ATTACKS: Primary | ICD-10-CM

## 2022-07-14 PROCEDURE — 99213 OFFICE O/P EST LOW 20 MIN: CPT

## 2022-07-14 RX ORDER — ESCITALOPRAM OXALATE 10 MG/1
10 TABLET ORAL DAILY
Qty: 90 TABLET | Refills: 0 | Status: SHIPPED | OUTPATIENT
Start: 2022-07-14

## 2022-07-14 NOTE — PSYCH
Virtual Regular Visit    Verification of patient location:    Patient is located in the following state in which I hold an active license [place]    Problem List Items Addressed This Visit    None     Visit Diagnoses     Generalized anxiety disorder with panic attacks    -  Primary             Encounter provider MIRANDA Schulte    Provider located at   [address] [id number] [phone]    Recent Visits  No visits were found meeting these conditions  Showing recent visits within past 7 days and meeting all other requirements  Today's Visits  Date Type Provider Dept   07/14/22 Telemedicine MIRANDA Schulte  [facility]   Showing today's visits and meeting all other requirements  Future Appointments  No visits were found meeting these conditions  Showing future appointments within next 150 days and meeting all other requirements         The patient was identified by name and date of birth  Evy Garnett was informed that this is a telemedicine visit and that the visit is being conducted throughEpic Embedded and patient was informed this is a secure, HIPAA-complaint platform  She agrees to proceed     My office door was closed  No one else was in the room  She acknowledged consent and understanding of privacy and security of the video platform  The patient has agreed to participate and understands they can discontinue the visit at any time  Patient is aware this is a billable service       HPI     Current Outpatient Medications   Medication Sig Dispense Refill    Aurovela 24 FE 1-20 MG-MCG(24) per tablet Take 1 tablet by mouth daily      busPIRone (BUSPAR) 5 mg tablet TAKE 1 TABLET BY MOUTH THREE TIMES A  tablet 1    dicyclomine (BENTYL) 20 mg tablet Take 1 tablet (20 mg total) by mouth every 6 (six) hours 60 tablet 3    escitalopram (LEXAPRO) 10 mg tablet Take 1 tablet (10 mg total) by mouth daily 30 tablet 0    escitalopram (LEXAPRO) 5 mg tablet Take 1 tablet (5 mg total) by mouth daily 30 tablet 0    LORazepam (ATIVAN) 0 5 mg tablet Take 1 tablet (0 5 mg total) by mouth every 8 (eight) hours as needed for anxiety 10 tablet 0    pantoprazole (PROTONIX) 40 mg tablet TAKE 1 TABLET BY MOUTH EVERY DAY 90 tablet 2     No current facility-administered medications for this visit  Review of Systems  Video Exam    There were no vitals filed for this visit  Physical Exam   As a result of this visit, I have referred the patient for further respiratory evaluation  No    I spent 20 minutes directly with the patient during this visit  [address] acknowledges that she has consented to an online visit or consultation  She understands that the online visit is based solely on information provided by her, and that, in the absence of a face-to-face physical evaluation by the physician, the diagnosis she receives is both limited and provisional in terms of accuracy and completeness  This is not intended to replace a full medical face-to-face evaluation by the physician  Mónica Godoy understands and accepts these terms  MEDICATION MANAGEMENT NOTE        [facility]    Name and Date of Birth:  Jennifer Hunter y o  2000 MRN: [id number]    Date of Visit: July 14, 2022    Allergies   Allergen Reactions    Lac Bovis      Mom states hyper sensitive to milk products     SUBJECTIVE:    Mónica is seen today for a follow up for Generalized Anxiety Disorder  She reports that she continues to experience on and off symptoms since the last visit  Patient has not been seen since February 2022 and was a no call no-show previously  Describes how she stopped taking her Lexapro 15 about two months ago and at first thought she was doing well but has struggled the past month with anxiety and is interested in restarting medication therapy  Anxiety symptoms include nervousness in her stomach, upset stomach, frequent worry, and restlessness  Denies panic attacks  Lexapro 10 mg was re-initiated during today's visit and sent into her pharmacy of choice  Patient currently in [place] at  [employer] and reports school going very well  Continue psychotherapy going forward  Denies suicidal ideation  She denies any side effects from medications  PLAN:    Re-start Lexapro 10mg daily  Continue therapy    Aware of 24 hour and weekend coverage for urgent situations accessed by calling [facility] main practice number  Continue psychotherapy with therapist    Diagnoses and all orders for this visit:    Generalized anxiety disorder with panic attacks        Current Outpatient Medications on File Prior to Visit   Medication Sig Dispense Refill    Aurovela 24 FE 1-20 MG-MCG(24) per tablet Take 1 tablet by mouth daily      busPIRone (BUSPAR) 5 mg tablet TAKE 1 TABLET BY MOUTH THREE TIMES A  tablet 1    dicyclomine (BENTYL) 20 mg tablet Take 1 tablet (20 mg total) by mouth every 6 (six) hours 60 tablet 3    escitalopram (LEXAPRO) 10 mg tablet Take 1 tablet (10 mg total) by mouth daily 30 tablet 0    escitalopram (LEXAPRO) 5 mg tablet Take 1 tablet (5 mg total) by mouth daily 30 tablet 0    LORazepam (ATIVAN) 0 5 mg tablet Take 1 tablet (0 5 mg total) by mouth every 8 (eight) hours as needed for anxiety 10 tablet 0    pantoprazole (PROTONIX) 40 mg tablet TAKE 1 TABLET BY MOUTH EVERY DAY 90 tablet 2     No current facility-administered medications on file prior to visit  Psychotherapy Provided:     Individual psychotherapy provided: Yes  Counseling was provided during the session today for 16 minutes  Supportive counseling provided  Medication changes discussed with Mónica  Medication education provided to Mónica RUELAS Appetite Changes and Sleep:     She reports normal sleep, adequate appetite, adequate energy level   Denies homicidal ideation, denies suicidal ideation    Review Of Systems:      HPI ROS:               Medication Side Effects:  denies    Depression (10 worst): 0/10    Anxiety (10 worst): 4/10    Safety concerns (SI, HI, etc): Denies si and hi    Sleep: 7 hrs    Energy: fair    Appetite: fair    Weight Change: denies        Mental Status Evaluation:    Appearance Adequate hygiene and grooming   Behavior calm and cooperative   Mood anxious  Depression Scale - 0 of 10 (0 = No depression)  Anxiety Scale - 4 of 10 (0 = No anxiety)   Speech Normal rate and volume   Affect appropriate   Thought Processes Goal directed and coherent   Thought Content Does not verbalize delusional material   Associations Tightly connected   Perceptual Disturbances Denies hallucinations and does not appear to be responding to internal stimuli   Risk Potential Suicidal/Homicidal Ideation - No evidence of suicidal or homicidal ideation and patient does not verbalize suicidal or homicidal ideation  Risk of Violence - No evidence of risk for violence found on assessment  Risk of Self Mutilation - No evidence of risk for self mutilation found on assessment   Orientation oriented to person, place, time/date and situation   Memory recent and remote memory grossly intact   Consciousness alert and awake   Attention/Concentration attention span and concentration are age appropriate   Insight intact   Judgement intact   Muscle Strength and Gait normal muscle strength and normal muscle tone, normal gait/station and normal balance   Motor Activity no abnormal movements   Language no difficulty naming common objects, no difficulty repeating a phrase, no difficulty writing a sentence   Fund of Knowledge adequate knowledge of current events  adequate fund of knowledge regarding past history  adequate fund of knowledge regarding vocabulary      Past Psychiatric History Update:     Inpatient Psychiatric Admission Since Last Encounter:   no  Changes to Outpatient Psychiatric Treatment Team:    no  Suicide Attempt Or Self Mutilation Since Last Encounter:   no  Incidence of Violent Behavior Since Last Encounter:   no    Traumatic History Update:     New Onset of Abuse Since Last Encounter:   no  Traumatic Events Since Last Encounter:   no    Past Medical History:    Past Medical History:   Diagnosis Date    GERD (gastroesophageal reflux disease)     Medical history reviewed with no changes         Past Surgical History:   Procedure Laterality Date    EYE SURGERY      lazy eye     Allergies   Allergen Reactions    Lac Bovis      Mom states hyper sensitive to milk products     Substance Abuse History:    Social History     Substance and Sexual Activity   Alcohol Use No     Social History     Substance and Sexual Activity   Drug Use Never     Social History:    Social History     Socioeconomic History    Marital status: Single     Spouse name: Not on file    Number of children: Not on file    Years of education: Not on file    Highest education level: Not on file   Occupational History    Not on file   Tobacco Use    Smoking status: Never Smoker    Smokeless tobacco: Never Used   Substance and Sexual Activity    Alcohol use: No    Drug use: Never    Sexual activity: Not on file   Other Topics Concern    Not on file   Social History Narrative    Denied:  Caffeine use     Social Determinants of Health     Financial Resource Strain: Not on file   Food Insecurity: Not on file   Transportation Needs: Not on file   Physical Activity: Not on file   Stress: Not on file   Social Connections: Not on file   Intimate Partner Violence: Not on file   Housing Stability: Not on file     Family Psychiatric History:     Family History   Problem Relation Age of Onset    Glaucoma Mother     Hyperlipidemia Mother         hypercholesterolemia    ETHAN disease Father     Hyperlipidemia Father         hypercholesterolemia History Review: The following portions of the patient's history were reviewed and updated as appropriate: allergies, current medications, past family history, past medical history, past social history, past surgical history and problem list     OBJECTIVE:     Vital signs in last 24 hours: There were no vitals filed for this visit  Laboratory Results: I have personally reviewed all pertinent laboratory/tests results  Suicide/Homicide Risk Assessment:    Risk of Harm to Self:  Protective Factors: no current suicidal ideation, access to mental health treatment, compliant with medications, compliant with mental health treatment, connection to community  Based on today's assessment, Mónica presents the following risk of harm to self: none    Risk of Harm to Others:  Based on today's assessment, Mónica presents the following risk of harm to others: none    The following interventions are recommended: referral for psychotherapy    Medications Risks/Benefits:      Risks, Benefits And Possible Side Effects Of Medications:    Discussed risks and benefits of treatment with patient including risk of suicidality, serotonin syndrome, increased QTc interval and SIADH related to treatment with antidepressants; Risk of induction of manic symptoms in certain patient populations     Controlled Medication Discussion:     Not applicable    Treatment Plan:    Due for update/Updated:   no  Last treatment plan done 11/16/21 by Tania Monsivais  Treatment Plan due on 5/16/22  MIRANDA Moreno 07/14/22    This note was shared with patient

## 2022-08-22 ENCOUNTER — TELEPHONE (OUTPATIENT)
Dept: PSYCHIATRY | Facility: CLINIC | Age: 22
End: 2022-08-22

## 2022-08-22 NOTE — TELEPHONE ENCOUNTER
DISCHARGE LETTER for Matt Joya LCSW (certified and regular) placed in outgoing mail on 08/22/22      Article #:  [id number]    Address:  [place] [id number]

## 2022-08-30 NOTE — TELEPHONE ENCOUNTER
Certificate for the Discharge Letter was signed/received on 8/30/2022  A copy has been scanned into Media

## 2022-11-25 ENCOUNTER — TELEMEDICINE (OUTPATIENT)
Dept: PSYCHIATRY | Facility: CLINIC | Age: 22
End: 2022-11-25

## 2022-11-25 DIAGNOSIS — F41.0 GENERALIZED ANXIETY DISORDER WITH PANIC ATTACKS: Primary | ICD-10-CM

## 2022-11-25 DIAGNOSIS — F41.9 ANXIETY: ICD-10-CM

## 2022-11-25 DIAGNOSIS — F41.1 GENERALIZED ANXIETY DISORDER WITH PANIC ATTACKS: Primary | ICD-10-CM

## 2022-11-25 PROBLEM — R11.0 NAUSEA: Status: ACTIVE | Noted: 2022-03-25

## 2022-11-25 RX ORDER — ESCITALOPRAM OXALATE 10 MG/1
10 TABLET ORAL DAILY
Qty: 90 TABLET | Refills: 1 | Status: SHIPPED | OUTPATIENT
Start: 2022-11-25

## 2022-11-25 NOTE — BH TREATMENT PLAN
TREATMENT PLAN (Medication Management Only)        [facility]    Name and Date of Birth:  Manav Olson 25 y o  2000  Date of Treatment Plan: November 25, 2022  Diagnosis/Diagnoses:    1  Generalized anxiety disorder with panic attacks    2  Anxiety      Strengths/Personal Resources for Self-Care: supportive family, supportive friends, taking medications as prescribed, ability to adapt to life changes, ability to communicate needs  Area/Areas of need (in own words): anxiety symptoms  1  Long Term Goal: maintain control of acceptable anxiety level  Target Date:6 months - 5/25/2023  Person/Persons responsible for completion of goal: Mónica  2  Short Term Objective (s) - How will we reach this goal?:   A  Provider new recommended medication/dosage changes and/or continue medication(s): continue current medications as prescribed  B  Attend medication management appointments regularly  C  Take psychiatric medications responsibly  Target Date:6 months - 5/25/2023  Person/Persons Responsible for Completion of Goal: Mónica and nata castro  Progress Towards Goals: continuing treatment  Treatment Modality: medication management every 3 months  Review due 180 days from date of this plan: 6 months - 5/25/2023  Expected length of service: maintenance  My Physician/PA/NP and I have developed this plan together and I agree to work on the goals and objectives  I understand the treatment goals that were developed for my treatment

## 2022-11-27 PROBLEM — F41.0 ANXIETY ATTACK: Status: RESOLVED | Noted: 2021-01-26 | Resolved: 2022-11-27

## 2022-11-27 PROBLEM — R11.0 NAUSEA: Status: RESOLVED | Noted: 2022-03-25 | Resolved: 2022-11-27

## 2022-12-13 ENCOUNTER — OFFICE VISIT (OUTPATIENT)
Dept: FAMILY MEDICINE CLINIC | Facility: CLINIC | Age: 22
End: 2022-12-13

## 2022-12-13 VITALS
OXYGEN SATURATION: 96 % | DIASTOLIC BLOOD PRESSURE: 76 MMHG | BODY MASS INDEX: 24.66 KG/M2 | WEIGHT: 167 LBS | HEART RATE: 87 BPM | SYSTOLIC BLOOD PRESSURE: 120 MMHG

## 2022-12-13 DIAGNOSIS — J18.9 PNEUMONIA OF RIGHT LOWER LOBE DUE TO INFECTIOUS ORGANISM: Primary | ICD-10-CM

## 2022-12-13 RX ORDER — ALBUTEROL SULFATE 90 UG/1
AEROSOL, METERED RESPIRATORY (INHALATION)
COMMUNITY
Start: 2022-11-29 | End: 2022-12-13 | Stop reason: ALTCHOICE

## 2022-12-13 NOTE — ASSESSMENT & PLAN NOTE
Patient was diagnosed with pneumonia and was treated with vantin and azithromycin and completed and is recovering will recheck the chest x-ray in 4 weeks

## 2022-12-13 NOTE — PROGRESS NOTES
Assessment/Plan:         Problem List Items Addressed This Visit        Respiratory    Pneumonia of right lower lobe due to infectious organism - Primary     Patient was diagnosed with pneumonia and was treated with vantin and azithromycin and completed and is recovering will recheck the chest x-ray in 4 weeks         Relevant Orders    XR chest pa & lateral          Subjective:      Patient ID: Elsy Dent is a 25 y o  female  Patient here today for follow up from her pneumonia and was diagnosed 12/6/2022 with right lower lobe pneumonia  Patient has finished the antibiotics and is here for follow up  She is feeling improvement and over the course of the day does get fatigued and is coughing at times and was given an inhaler as needed  The following portions of the patient's history were reviewed and updated as appropriate:   She  has a past medical history of GERD (gastroesophageal reflux disease) and Medical history reviewed with no changes  She   Patient Active Problem List    Diagnosis Date Noted   • Pneumonia of right lower lobe due to infectious organism 12/13/2022   • IBS (irritable bowel syndrome) 07/05/2022   • GERD (gastroesophageal reflux disease) 07/05/2022   • Anxiety 11/08/2021     She  has a past surgical history that includes Eye surgery  Her family history includes ETHAN disease in her father; Glaucoma in her mother; Hyperlipidemia in her father and mother  She  reports that she has never smoked  She has never used smokeless tobacco  She reports that she does not drink alcohol and does not use drugs  Current Outpatient Medications   Medication Sig Dispense Refill   • escitalopram (LEXAPRO) 10 mg tablet Take 1 tablet (10 mg total) by mouth daily 90 tablet 1     No current facility-administered medications for this visit  She is allergic to allyl isothiocyanate and lac bovis       Review of Systems   Constitutional: Negative for activity change, appetite change, chills, diaphoresis, fatigue, fever and unexpected weight change  HENT: Negative for congestion, ear pain, hearing loss, postnasal drip, sinus pressure, sinus pain, sneezing and sore throat  Eyes: Negative for pain, redness and visual disturbance  Respiratory: Negative for cough and shortness of breath  Cardiovascular: Negative for chest pain and leg swelling  Gastrointestinal: Negative for abdominal pain, diarrhea, nausea and vomiting  Endocrine: Negative  Genitourinary: Negative  Musculoskeletal: Negative for arthralgias  Allergic/Immunologic: Negative  Neurological: Negative for dizziness and light-headedness  Hematological: Negative  Psychiatric/Behavioral: Negative for behavioral problems and dysphoric mood  Objective:      /76   Pulse 87   Wt 75 8 kg (167 lb)   SpO2 96%   BMI 24 66 kg/m²          Physical Exam  Vitals and nursing note reviewed  Constitutional:       General: She is not in acute distress  Appearance: She is well-developed  HENT:      Head: Normocephalic and atraumatic  Right Ear: Tympanic membrane normal       Left Ear: Tympanic membrane normal       Nose: Nose normal       Mouth/Throat:      Mouth: Mucous membranes are moist    Eyes:      Pupils: Pupils are equal, round, and reactive to light  Neck:      Thyroid: No thyromegaly  Cardiovascular:      Rate and Rhythm: Normal rate and regular rhythm  Heart sounds: Normal heart sounds  No murmur heard  Pulmonary:      Effort: Pulmonary effort is normal  No respiratory distress  Breath sounds: Normal breath sounds  No wheezing  Abdominal:      General: Bowel sounds are normal       Palpations: Abdomen is soft  Musculoskeletal:         General: Normal range of motion  Cervical back: Normal range of motion  Skin:     General: Skin is warm and dry  Neurological:      Mental Status: She is alert and oriented to person, place, and time

## 2023-01-12 ENCOUNTER — APPOINTMENT (OUTPATIENT)
Dept: RADIOLOGY | Facility: CLINIC | Age: 23
End: 2023-01-12

## 2023-01-12 DIAGNOSIS — J18.9 PNEUMONIA OF RIGHT LOWER LOBE DUE TO INFECTIOUS ORGANISM: ICD-10-CM

## 2023-01-13 ENCOUNTER — OFFICE VISIT (OUTPATIENT)
Dept: FAMILY MEDICINE CLINIC | Facility: CLINIC | Age: 23
End: 2023-01-13

## 2023-01-13 VITALS
OXYGEN SATURATION: 97 % | DIASTOLIC BLOOD PRESSURE: 78 MMHG | BODY MASS INDEX: 30.3 KG/M2 | WEIGHT: 204.6 LBS | HEART RATE: 88 BPM | TEMPERATURE: 97.9 F | HEIGHT: 69 IN | SYSTOLIC BLOOD PRESSURE: 118 MMHG

## 2023-01-13 DIAGNOSIS — J18.9 PNEUMONIA OF RIGHT LOWER LOBE DUE TO INFECTIOUS ORGANISM: ICD-10-CM

## 2023-01-13 DIAGNOSIS — Z00.00 ANNUAL PHYSICAL EXAM: Primary | ICD-10-CM

## 2023-01-13 DIAGNOSIS — F41.9 ANXIETY: ICD-10-CM

## 2023-01-13 NOTE — ASSESSMENT & PLAN NOTE
Patient is not currently taking her lexapro at this point and reports that her psychiatry wants her to have evaluation for ADHD and is following with therapy monthly

## 2023-01-13 NOTE — PATIENT INSTRUCTIONS

## 2023-01-13 NOTE — PROGRESS NOTES
ADULT [facility]ly [facility]    NAME: Derian Kumar  AGE: 25 y o  SEX: female  : 2000     DATE: 2023     Assessment and Plan:     Problem List Items Addressed This Visit        Respiratory    Pneumonia of right lower lobe due to infectious organism     Patient is doing well and is feeling well and had her chest x-ray completed             Other    Anxiety     Patient is not currently taking her lexapro at this point and reports that her psychiatry wants her to have evaluation for ADHD and is following with therapy monthly          Annual physical exam - Primary       Immunizations and preventive care screenings were discussed with patient today  Appropriate education was printed on patient's after visit summary  Counseling:  Injury prevention: discussed safety/seat belts, safety helmets, smoke detectors, carbon dioxide detectors, and smoking near bedding or upholstery  Exercise: the importance of regular exercise/physical activity was discussed  Recommend exercise 3-5 times per week for at least 30 minutes  BMI Counseling: Body mass index is 30 21 kg/m²  The BMI is above normal  Nutrition recommendations include decreasing portion sizes, encouraging healthy choices of fruits and vegetables, decreasing fast food intake, consuming healthier snacks, limiting drinks that contain sugar, moderation in carbohydrate intake, increasing intake of lean protein, reducing intake of saturated and trans fat and reducing intake of cholesterol  Exercise recommendations include moderate physical activity 150 minutes/week  No pharmacotherapy was ordered  Patient referred to PCP  Rationale for BMI follow-up plan is due to patient being overweight or obese  Depression Screening and Follow-up Plan: Patient was screened for depression during today's encounter  They screened negative with a PHQ-2 score of 0  Return in 1 year (on 2024)  Chief Complaint:     Chief Complaint   Patient presents with   • Follow-up     4 weeks        History of Present Illness:     Adult Annual Physical   Patient here for a comprehensive physical exam  The patient reports no problems  Diet and Physical Activity  Diet/Nutrition: well balanced diet  Exercise: moderate cardiovascular exercise and 5-7 times a week on average  Depression Screening  PHQ-2/9 Depression Screening    Little interest or pleasure in doing things: 0 - not at all  Feeling down, depressed, or hopeless: 0 - not at all  PHQ-2 Score: 0  PHQ-2 Interpretation: Negative depression screen       General Health  Sleep: sleeps well and gets 7-8 hours of sleep on average  Hearing: normal - bilateral   Vision: goes for regular eye exams, most recent eye exam <1 year ago and wears glasses  Dental: regular dental visits, brushes teeth twice daily and flosses teeth occasionally  /GYN Health  Last menstrual period: 1/13/2023  Contraceptive method: none  History of STDs?: no      Review of Systems:     Review of Systems   Constitutional: Negative for activity change, appetite change, chills, diaphoresis, fatigue, fever and unexpected weight change  HENT: Negative for congestion, ear pain, hearing loss, postnasal drip, sinus pressure, sinus pain, sneezing and sore throat  Eyes: Negative for pain, redness and visual disturbance  Respiratory: Negative for cough and shortness of breath  Cardiovascular: Negative for chest pain and leg swelling  Gastrointestinal: Negative for abdominal pain, diarrhea, nausea and vomiting  Musculoskeletal: Negative for arthralgias  Neurological: Negative for dizziness and light-headedness  Psychiatric/Behavioral: Negative for behavioral problems and dysphoric mood        Past Medical History:     Past Medical History:   Diagnosis Date   • GERD (gastroesophageal reflux disease)    • Medical history reviewed with no changes       Past Surgical History: Past Surgical History:   Procedure Laterality Date   • EYE SURGERY      lazy eye      Social History:     Social History     Socioeconomic History   • Marital status: Single     Spouse name: None   • Number of children: None   • Years of education: None   • Highest education level: None   Occupational History   • None   Tobacco Use   • Smoking status: Never   • Smokeless tobacco: Never   Substance and Sexual Activity   • Alcohol use: No   • Drug use: Never   • Sexual activity: None   Other Topics Concern   • None   Social History Narrative    Denied:  Caffeine use     Social Determinants of Health     Financial Resource Strain: Not on file   Food Insecurity: Not on file   Transportation Needs: Not on file   Physical Activity: Not on file   Stress: Not on file   Social Connections: Not on file   Intimate Partner Violence: Not on file   Housing Stability: Not on file      Family History:     Family History   Problem Relation Age of Onset   • Glaucoma Mother    • Hyperlipidemia Mother         hypercholesterolemia   • ETHAN disease Father    • Hyperlipidemia Father         hypercholesterolemia      Current Medications:     No current outpatient medications on file  No current facility-administered medications for this visit  Allergies: Allergies   Allergen Reactions   • Allyl Isothiocyanate Hives   • Lac Bovis Diarrhea     Mom states hyper sensitive to milk products      Physical Exam:     /78 (BP Location: Left arm, Patient Position: Sitting)   Pulse 88   Temp 97 9 °F (36 6 °C) (Temporal)   Ht 5' 9" (1 753 m)   Wt 92 8 kg (204 lb 9 6 oz)   SpO2 97%   BMI 30 21 kg/m²     Physical Exam  Vitals and nursing note reviewed  Constitutional:       General: She is not in acute distress  Appearance: She is well-developed  HENT:      Head: Normocephalic and atraumatic  Eyes:      Conjunctiva/sclera: Conjunctivae normal    Cardiovascular:      Rate and Rhythm: Normal rate and regular rhythm  Heart sounds: No murmur heard  Pulmonary:      Effort: Pulmonary effort is normal  No respiratory distress  Breath sounds: Normal breath sounds  Abdominal:      Palpations: Abdomen is soft  Tenderness: There is no abdominal tenderness  Musculoskeletal:         General: No swelling  Cervical back: Neck supple  Skin:     General: Skin is warm and dry  Capillary Refill: Capillary refill takes less than 2 seconds  Neurological:      Mental Status: She is alert  Psychiatric:         Mood and Affect: Mood normal         PHQ-2/9 Depression Screening    Little interest or pleasure in doing things: 0 - not at all  Feeling down, depressed, or hopeless: 0 - not at all  PHQ-2 Score: 0  PHQ-2 Interpretation: Negative depression screen       RUSLAN-7 Flowsheet Screening    Flowsheet Row Most Recent Value   Over the last 2 weeks, how often have you been bothered by any of the following problems?     Feeling nervous, anxious, or on edge 0   Not being able to stop or control worrying 0   Worrying too much about different things 0   Trouble relaxing 0   Being so restless that it is hard to sit still 0   Becoming easily annoyed or irritable 0   Feeling afraid as if something awful might happen 0   RUSLAN-7 Total Score 0        Abraham Chavez

## 2023-02-11 PROBLEM — J18.9 PNEUMONIA OF RIGHT LOWER LOBE DUE TO INFECTIOUS ORGANISM: Status: RESOLVED | Noted: 2022-12-13 | Resolved: 2023-02-11

## 2023-05-23 ENCOUNTER — OFFICE VISIT (OUTPATIENT)
Dept: FAMILY MEDICINE CLINIC | Facility: CLINIC | Age: 23
End: 2023-05-23

## 2023-05-23 VITALS
TEMPERATURE: 98.1 F | OXYGEN SATURATION: 97 % | SYSTOLIC BLOOD PRESSURE: 112 MMHG | WEIGHT: 200 LBS | HEIGHT: 69 IN | HEART RATE: 75 BPM | BODY MASS INDEX: 29.62 KG/M2 | DIASTOLIC BLOOD PRESSURE: 62 MMHG

## 2023-05-23 DIAGNOSIS — R10.2 ADNEXAL TENDERNESS, LEFT: ICD-10-CM

## 2023-05-23 DIAGNOSIS — M54.50 ACUTE LEFT-SIDED LOW BACK PAIN WITHOUT SCIATICA: Primary | ICD-10-CM

## 2023-05-23 DIAGNOSIS — N30.00 ACUTE CYSTITIS WITHOUT HEMATURIA: ICD-10-CM

## 2023-05-23 LAB
SL AMB  POCT GLUCOSE, UA: NORMAL
SL AMB LEUKOCYTE ESTERASE,UA: NORMAL
SL AMB POCT BILIRUBIN,UA: NORMAL
SL AMB POCT BLOOD,UA: NORMAL
SL AMB POCT CLARITY,UA: CLEAR
SL AMB POCT COLOR,UA: YELLOW
SL AMB POCT KETONES,UA: NORMAL
SL AMB POCT NITRITE,UA: NORMAL
SL AMB POCT PH,UA: 6
SL AMB POCT SPECIFIC GRAVITY,UA: 1.01
SL AMB POCT URINE PROTEIN: NORMAL
SL AMB POCT UROBILINOGEN: NORMAL

## 2023-05-23 RX ORDER — OMEGA-3 FATTY ACIDS/FISH OIL 300-1000MG
CAPSULE ORAL
COMMUNITY

## 2023-05-23 NOTE — PROGRESS NOTES
Name: Catracho Kearns      : 2000      MRN: [id number]  Encounter Provider: Karen Beal PA-C  Encounter Date: 2023   Encounter department: [address] [place] [address]     1  Acute left-sided low back pain without sciatica  -     POCT urine dip  -     Urine culture; Future  -     Urine culture    2  Adnexal tenderness, left  -     US pelvis complete non OB; Future; Expected date: 2023  -     Urine culture; Future  -     Urine culture    3  Acute cystitis without hematuria  repeat UA normal  No reproducible back pain  There is L adnexal region TTP  Recommend pelvic US  Her CT a/p was non revealing  Will send urine for culture to assure resolution of UTI  Other consideration would be stone obscured by contrast enhanced scan, but lower suspicion given no blood on repeat UA  Supportive measures, adilia control discussed  Will follow with urine culture and US results  Return precautions provided  Subjective     Pt presents for follow up  She was at [facility] and dx with UTI after having urinary sx and abdominal cramping  hcg neg  CT normal  Urinary sx improved but not feeling more urgency/frequency  No fevers  Also noted L lower back pain  L pelvic pain  No N/V/D  No vaginal bleeding/discharge  No concerns for STIs  She notes she has not had menses since stopping OCP 6 months ago    Difficulty Urinating   This is a recurrent problem  The current episode started 1 to 4 weeks ago  The problem occurs intermittently  The problem has been resolved  The quality of the pain is described as burning  The pain is at a severity of 2/10  There has been no fever  She is not sexually active  There is no history of pyelonephritis  Associated symptoms include flank pain, frequency and urgency  Pertinent negatives include no chills, nausea or vomiting  Review of Systems   Constitutional: Negative for chills, fatigue and fever     HENT: Negative for congestion, ear pain, hearing loss, nosebleeds, postnasal drip, rhinorrhea, sinus pressure, sinus pain, sneezing and sore throat  Eyes: Negative for pain, discharge, itching and visual disturbance  Respiratory: Negative for cough, chest tightness, shortness of breath and wheezing  Cardiovascular: Negative for chest pain, palpitations and leg swelling  Gastrointestinal: Negative for abdominal pain, blood in stool, constipation, diarrhea, nausea and vomiting  Genitourinary: Positive for dysuria, flank pain, frequency, pelvic pain and urgency  Musculoskeletal: Positive for back pain  Neurological: Negative for dizziness, light-headedness and numbness         Past Medical History:   Diagnosis Date   • Allergic    • Anxiety    • GERD (gastroesophageal reflux disease)    • Medical history reviewed with no changes      Past Surgical History:   Procedure Laterality Date   • EYE SURGERY      lazy eye     Family History   Problem Relation Age of Onset   • Glaucoma Mother    • Hyperlipidemia Mother         hypercholesterolemia   • Depression Mother    • Anxiety disorder Mother    • ETHAN disease Father    • Hyperlipidemia Father         hypercholesterolemia     Social History     Socioeconomic History   • Marital status: Single     Spouse name: None   • Number of children: None   • Years of education: None   • Highest education level: None   Occupational History   • None   Tobacco Use   • Smoking status: Never   • Smokeless tobacco: Never   Substance and Sexual Activity   • Alcohol use: No   • Drug use: Never   • Sexual activity: Not Currently     Partners: Male     Birth control/protection: Condom Male   Other Topics Concern   • None   Social History Narrative    Denied:  Caffeine use     Social Determinants of Health     Financial Resource Strain: Not on file   Food Insecurity: Not on file   Transportation Needs: Not on file   Physical Activity: Not on file   Stress: Not on file   Social Connections: Not on file   Intimate Partner Violence: Not on "file   Housing Stability: Not on file     Current Outpatient Medications on File Prior to Visit   Medication Sig   • Ibuprofen (Advil Liqui-Gels minis) 200 MG CAPS Take by mouth     Allergies   Allergen Reactions   • Allyl Isothiocyanate Hives   • Lac Bovis Diarrhea     Mom states hyper sensitive to milk products     Immunization History   Administered Date(s) Administered   • COVID-19 J&J (Dianne) vaccine 0 5 mL 08/03/2021   • DTaP 2000, 2000, 02/27/2002, 02/26/2003, 04/04/2006   • DTaP 5 2000, 2001, 02/27/2002, 02/26/2003, 04/04/2006   • Hep A, adult 11/20/2007   • Hep B, Adolescent or Pediatric 2001, 05/29/2002, 07/06/2006   • Hep B, adult 2001, 05/29/2002, 07/06/2006   • Hepatitis A 11/20/2007   • IPV 2000, 2000, 02/27/2002, 04/04/2006   • Influenza Quadrivalent Preservative Free 3 years and older IM 01/21/2015   • Influenza, seasonal, injectable 01/11/2013   • MMR 11/05/2002, 04/04/2006   • Meningococcal MCV4P 03/04/2013, 11/10/2017   • Meningococcal, Unknown Serogroups 03/04/2013, 11/10/2017   • TD (adult) Preservative Free 03/30/2022   • Tdap 03/04/2013   • Tuberculin Skin Test-PPD Intradermal 05/20/2020   • Varicella 08/28/2002, 11/20/2007       Objective     /62   Pulse 75   Temp 98 1 °F (36 7 °C)   Ht 5' 9\" (1 753 m)   Wt 90 7 kg (200 lb)   SpO2 97%   BMI 29 53 kg/m²     Physical Exam  Vitals and nursing note reviewed  Constitutional:       General: She is not in acute distress  Appearance: Normal appearance  HENT:      Head: Normocephalic and atraumatic  Nose: Nose normal    Eyes:      Pupils: Pupils are equal, round, and reactive to light  Cardiovascular:      Rate and Rhythm: Normal rate and regular rhythm  Heart sounds: Normal heart sounds  Pulmonary:      Effort: Pulmonary effort is normal  No respiratory distress  Breath sounds: Normal breath sounds     Abdominal:      General: Bowel sounds are normal  There is no " distension  Palpations: Abdomen is soft  There is no mass  Tenderness: There is no guarding  Musculoskeletal:         General: Normal range of motion  Cervical back: Normal range of motion and neck supple  Lumbar back: No spasms, tenderness or bony tenderness  Normal range of motion  Skin:     General: Skin is warm and dry  Neurological:      Mental Status: She is alert and oriented to person, place, and time     Psychiatric:         Mood and Affect: Mood and affect normal        Judy Spangler PA-C

## 2023-05-25 LAB — BACTERIA UR CULT: ABNORMAL

## 2023-05-26 ENCOUNTER — TELEPHONE (OUTPATIENT)
Dept: FAMILY MEDICINE CLINIC | Facility: CLINIC | Age: 23
End: 2023-05-26

## 2023-05-26 DIAGNOSIS — N39.0 URINARY TRACT INFECTION WITHOUT HEMATURIA, SITE UNSPECIFIED: Primary | ICD-10-CM

## 2023-05-26 RX ORDER — CEPHALEXIN 500 MG/1
500 CAPSULE ORAL EVERY 6 HOURS SCHEDULED
Qty: 20 CAPSULE | Refills: 0 | Status: SHIPPED | OUTPATIENT
Start: 2023-05-26 | End: 2023-05-31

## 2023-05-26 NOTE — TELEPHONE ENCOUNTER
Hi, this is Mónica Evangelista calling back  So my Honorio Hutchins called in a prescription Keflex for me because there's evidence that there was an infection  Just an update, I had to go to the hospital around two days ago because my pain was so bad  And they did an ultrasound there and found evidence of polycystic ovarian syndrome, systemic ovaries  So I'm just a little confused now what I should do, if I should continue taking the medicine or what's going on  So I'm still in pain, but OK, you can reach me at [phone]  Thanks   Nilay

## 2023-07-06 ENCOUNTER — OFFICE VISIT (OUTPATIENT)
Dept: FAMILY MEDICINE CLINIC | Facility: CLINIC | Age: 23
End: 2023-07-06
Payer: COMMERCIAL

## 2023-07-06 VITALS
BODY MASS INDEX: 29.51 KG/M2 | HEART RATE: 107 BPM | DIASTOLIC BLOOD PRESSURE: 74 MMHG | HEIGHT: 69 IN | SYSTOLIC BLOOD PRESSURE: 110 MMHG | TEMPERATURE: 97.9 F | OXYGEN SATURATION: 98 % | WEIGHT: 199.2 LBS

## 2023-07-06 DIAGNOSIS — K21.9 GASTROESOPHAGEAL REFLUX DISEASE WITHOUT ESOPHAGITIS: ICD-10-CM

## 2023-07-06 DIAGNOSIS — K58.2 IRRITABLE BOWEL SYNDROME WITH BOTH CONSTIPATION AND DIARRHEA: Primary | ICD-10-CM

## 2023-07-06 PROCEDURE — 99213 OFFICE O/P EST LOW 20 MIN: CPT | Performed by: NURSE PRACTITIONER

## 2023-07-06 RX ORDER — DROSPIRENONE AND ETHINYL ESTRADIOL 0.03MG-3MG
KIT ORAL
COMMUNITY
Start: 2023-06-18

## 2023-07-06 NOTE — ASSESSMENT & PLAN NOTE
Experiencing symptoms of burning, increased when laying flat. Nausea and vomiting. History of the same, and OTC medications not working.  Will order PPI

## 2023-07-06 NOTE — PROGRESS NOTES
Name: Arn Babinski      : 2000      MRN: [id number]  Encounter Provider: Georga Hodgkin, CRNP  Encounter Date: 2023   Encounter department: [address] [place] [address]     1. Irritable bowel syndrome with both constipation and diarrhea  Assessment & Plan:  Intermittent constipation and diarrhea       2. Gastroesophageal reflux disease without esophagitis  Assessment & Plan:  Experiencing symptoms of burning, increased when laying flat. Nausea and vomiting. History of the same, and OTC medications not working. Will order PPI    Orders:  -     esomeprazole (NexIUM) 20 mg capsule; Take 1 capsule (20 mg total) by mouth in the morning      BMI Counseling: Body mass index is 29.42 kg/m². The BMI is above normal. Nutrition recommendations include decreasing portion sizes, encouraging healthy choices of fruits and vegetables, decreasing fast food intake, consuming healthier snacks, limiting drinks that contain sugar, moderation in carbohydrate intake, increasing intake of lean protein, reducing intake of saturated and trans fat and reducing intake of cholesterol. Exercise recommendations include exercising 3-5 times per week. No pharmacotherapy was ordered. Patient referred to PCP. Rationale for BMI follow-up plan is due to patient being overweight or obese. Subjective      Patient presents today with concerns of a burning in her mid upper abdomen. States about 7 days ago she had an episode of sudden onset of burning and then she began vomiting. The vomiting only lasted that one night, and ever since she has been experiencing nausea, bloating, intermittent constipation and diarrhea. She started a new birth control about 3 weeks ago and spoke to the GYN to inquire if it was a side effect and they didn't that the new pill had any contributing factors. She does have a history of GERD and on medication daily.  She tried mylanta, and it did not provide relief, additionally tried pepto which did not work. She has been limiting her diet to crackers and water which she is still feeling bloated. Last BM was this morning and it was diarrhea. Abdominal Pain  This is a new problem. The current episode started in the past 7 days. The onset quality is sudden. The problem occurs constantly. The most recent episode lasted 1 hours. The problem has been waxing and waning since onset. The pain is located in the epigastric region and left flank. The pain is at a severity of 4/10. The quality of the pain is described as burning and dull. Associated symptoms include anorexia, diarrhea, nausea and vomiting. Pertinent negatives include no fever, frequency, headaches or sore throat. The symptoms are aggravated by eating. The symptoms are relieved by liquids and sitting up. Prior diagnostic workup includes CT scan and ultrasound. Review of Systems   Constitutional: Negative for fatigue and fever. HENT: Negative for congestion, sore throat and tinnitus. Eyes: Negative for visual disturbance. Respiratory: Negative for chest tightness and shortness of breath. Cardiovascular: Negative for chest pain and palpitations. Gastrointestinal: Positive for abdominal pain, anorexia, diarrhea, nausea and vomiting. Endocrine: Negative. Genitourinary: Negative for difficulty urinating, flank pain, frequency, pelvic pain and urgency. Musculoskeletal: Negative. Skin: Negative. Allergic/Immunologic: Negative. Neurological: Negative for dizziness, light-headedness, numbness and headaches. Hematological: Negative. Psychiatric/Behavioral: Negative.         Current Outpatient Medications on File Prior to Visit   Medication Sig   • drospirenone-ethinyl estradiol (SELENE) 3-0.03 MG per tablet    • [DISCONTINUED] Ibuprofen (Advil Liqui-Gels minis) 200 MG CAPS Take by mouth (Patient not taking: Reported on 7/6/2023)       Objective     /74 (BP Location: Left arm, Patient Position: Sitting)   Pulse (!) 107   Temp 97.9 °F (36.6 °C) (Temporal)   Ht 5' 9" (1.753 m)   Wt 90.4 kg (199 lb 3.2 oz)   SpO2 98%   BMI 29.42 kg/m²     Physical Exam  Vitals and nursing note reviewed. Constitutional:       General: She is not in acute distress. Appearance: Normal appearance. She is normal weight. She is not ill-appearing or toxic-appearing. HENT:      Head: Normocephalic and atraumatic. Right Ear: Tympanic membrane and external ear normal.      Left Ear: Tympanic membrane and external ear normal.      Nose: Nose normal.      Mouth/Throat:      Mouth: Mucous membranes are moist.   Eyes:      Extraocular Movements: Extraocular movements intact. Pupils: Pupils are equal, round, and reactive to light. Cardiovascular:      Rate and Rhythm: Normal rate and regular rhythm. Pulses: Normal pulses. Heart sounds: Normal heart sounds. No murmur heard. Pulmonary:      Effort: Pulmonary effort is normal. No respiratory distress. Breath sounds: Normal breath sounds. Abdominal:      General: There is no distension. Palpations: Abdomen is soft. Tenderness: There is no abdominal tenderness. Musculoskeletal:         General: Normal range of motion. Cervical back: Normal range of motion and neck supple. No tenderness. Skin:     General: Skin is warm. Capillary Refill: Capillary refill takes less than 2 seconds. Neurological:      General: No focal deficit present. Mental Status: She is alert and oriented to person, place, and time. Psychiatric:         Mood and Affect: Mood normal.         Behavior: Behavior normal.         Thought Content:  Thought content normal.         Judgment: Judgment normal.       MIRANDA Royal

## 2023-07-12 ENCOUNTER — APPOINTMENT (EMERGENCY)
Dept: CT IMAGING | Facility: HOSPITAL | Age: 23
End: 2023-07-12
Payer: COMMERCIAL

## 2023-07-12 ENCOUNTER — HOSPITAL ENCOUNTER (EMERGENCY)
Facility: HOSPITAL | Age: 23
Discharge: HOME | End: 2023-07-13
Attending: EMERGENCY MEDICINE | Admitting: EMERGENCY MEDICINE
Payer: COMMERCIAL

## 2023-07-12 DIAGNOSIS — R10.13 EPIGASTRIC ABDOMINAL PAIN: ICD-10-CM

## 2023-07-12 DIAGNOSIS — R19.7 NAUSEA VOMITING AND DIARRHEA: Primary | ICD-10-CM

## 2023-07-12 DIAGNOSIS — R11.2 NAUSEA VOMITING AND DIARRHEA: Primary | ICD-10-CM

## 2023-07-12 LAB
ALBUMIN SERPL BCP-MCNC: 5 G/DL (ref 3.5–5)
ALP SERPL-CCNC: 48 U/L (ref 34–104)
ALT SERPL W P-5'-P-CCNC: 18 U/L (ref 7–52)
ANION GAP SERPL CALCULATED.3IONS-SCNC: 15 MMOL/L
AST SERPL W P-5'-P-CCNC: 18 U/L (ref 13–39)
BASOPHILS # BLD AUTO: 0.03 THOUSANDS/ÂΜL (ref 0–0.1)
BASOPHILS NFR BLD AUTO: 0 % (ref 0–1)
BILIRUB SERPL-MCNC: 0.7 MG/DL (ref 0.2–1)
BUN SERPL-MCNC: 20 MG/DL (ref 5–25)
CALCIUM SERPL-MCNC: 9.8 MG/DL (ref 8.4–10.2)
CARDIAC TROPONIN I PNL SERPL HS: <2 NG/L
CHLORIDE SERPL-SCNC: 105 MMOL/L (ref 96–108)
CO2 SERPL-SCNC: 17 MMOL/L (ref 21–32)
CREAT SERPL-MCNC: 1.22 MG/DL (ref 0.6–1.3)
D DIMER PPP FEU-MCNC: 0.36 UG/ML FEU
EOSINOPHIL # BLD AUTO: 0.06 THOUSAND/ÂΜL (ref 0–0.61)
EOSINOPHIL NFR BLD AUTO: 0 % (ref 0–6)
ERYTHROCYTE [DISTWIDTH] IN BLOOD BY AUTOMATED COUNT: 11.8 % (ref 11.6–15.1)
GFR SERPL CREATININE-BSD FRML MDRD: 63 ML/MIN/1.73SQ M
GLUCOSE SERPL-MCNC: 166 MG/DL (ref 65–140)
HCG SERPL QL: NEGATIVE
HCT VFR BLD AUTO: 47.4 % (ref 34.8–46.1)
HGB BLD-MCNC: 17.1 G/DL (ref 11.5–15.4)
IMM GRANULOCYTES # BLD AUTO: 0.05 THOUSAND/UL (ref 0–0.2)
IMM GRANULOCYTES NFR BLD AUTO: 0 % (ref 0–2)
LIPASE SERPL-CCNC: 10 U/L (ref 11–82)
LYMPHOCYTES # BLD AUTO: 0.92 THOUSANDS/ÂΜL (ref 0.6–4.47)
LYMPHOCYTES NFR BLD AUTO: 7 % (ref 14–44)
MCH RBC QN AUTO: 31 PG (ref 26.8–34.3)
MCHC RBC AUTO-ENTMCNC: 36.1 G/DL (ref 31.4–37.4)
MCV RBC AUTO: 86 FL (ref 82–98)
MONOCYTES # BLD AUTO: 0.74 THOUSAND/ÂΜL (ref 0.17–1.22)
MONOCYTES NFR BLD AUTO: 5 % (ref 4–12)
NEUTROPHILS # BLD AUTO: 12.39 THOUSANDS/ÂΜL (ref 1.85–7.62)
NEUTS SEG NFR BLD AUTO: 88 % (ref 43–75)
NRBC BLD AUTO-RTO: 0 /100 WBCS
PLATELET # BLD AUTO: 266 THOUSANDS/UL (ref 149–390)
PMV BLD AUTO: 9 FL (ref 8.9–12.7)
POTASSIUM SERPL-SCNC: 3.8 MMOL/L (ref 3.5–5.3)
PROT SERPL-MCNC: 8.3 G/DL (ref 6.4–8.4)
RBC # BLD AUTO: 5.52 MILLION/UL (ref 3.81–5.12)
SODIUM SERPL-SCNC: 137 MMOL/L (ref 135–147)
WBC # BLD AUTO: 14.19 THOUSAND/UL (ref 4.31–10.16)

## 2023-07-12 PROCEDURE — 84703 CHORIONIC GONADOTROPIN ASSAY: CPT | Performed by: EMERGENCY MEDICINE

## 2023-07-12 PROCEDURE — 83690 ASSAY OF LIPASE: CPT | Performed by: EMERGENCY MEDICINE

## 2023-07-12 PROCEDURE — 85025 COMPLETE CBC W/AUTO DIFF WBC: CPT | Performed by: EMERGENCY MEDICINE

## 2023-07-12 PROCEDURE — 36415 COLL VENOUS BLD VENIPUNCTURE: CPT | Performed by: EMERGENCY MEDICINE

## 2023-07-12 PROCEDURE — 80053 COMPREHEN METABOLIC PANEL: CPT | Performed by: EMERGENCY MEDICINE

## 2023-07-12 PROCEDURE — G1004 CDSM NDSC: HCPCS

## 2023-07-12 PROCEDURE — 93005 ELECTROCARDIOGRAM TRACING: CPT

## 2023-07-12 PROCEDURE — 74177 CT ABD & PELVIS W/CONTRAST: CPT

## 2023-07-12 PROCEDURE — 85379 FIBRIN DEGRADATION QUANT: CPT | Performed by: EMERGENCY MEDICINE

## 2023-07-12 PROCEDURE — 84484 ASSAY OF TROPONIN QUANT: CPT | Performed by: EMERGENCY MEDICINE

## 2023-07-12 RX ORDER — DIPHENHYDRAMINE HYDROCHLORIDE 50 MG/ML
50 INJECTION INTRAMUSCULAR; INTRAVENOUS ONCE
Status: COMPLETED | OUTPATIENT
Start: 2023-07-12 | End: 2023-07-13

## 2023-07-12 RX ORDER — METOCLOPRAMIDE HYDROCHLORIDE 5 MG/ML
5 INJECTION INTRAMUSCULAR; INTRAVENOUS ONCE
Status: COMPLETED | OUTPATIENT
Start: 2023-07-12 | End: 2023-07-12

## 2023-07-12 RX ORDER — KETOROLAC TROMETHAMINE 30 MG/ML
15 INJECTION, SOLUTION INTRAMUSCULAR; INTRAVENOUS ONCE
Status: COMPLETED | OUTPATIENT
Start: 2023-07-12 | End: 2023-07-12

## 2023-07-12 RX ORDER — ONDANSETRON 2 MG/ML
4 INJECTION INTRAMUSCULAR; INTRAVENOUS ONCE
Status: COMPLETED | OUTPATIENT
Start: 2023-07-12 | End: 2023-07-12

## 2023-07-12 RX ADMIN — ONDANSETRON 4 MG: 2 INJECTION INTRAMUSCULAR; INTRAVENOUS at 21:00

## 2023-07-12 RX ADMIN — SODIUM CHLORIDE 1000 ML: 0.9 INJECTION, SOLUTION INTRAVENOUS at 20:58

## 2023-07-12 RX ADMIN — METOCLOPRAMIDE 5 MG: 5 INJECTION, SOLUTION INTRAMUSCULAR; INTRAVENOUS at 23:57

## 2023-07-12 RX ADMIN — KETOROLAC TROMETHAMINE 15 MG: 30 INJECTION, SOLUTION INTRAMUSCULAR at 23:59

## 2023-07-12 RX ADMIN — IOHEXOL 100 ML: 350 INJECTION, SOLUTION INTRAVENOUS at 22:42

## 2023-07-13 VITALS
HEART RATE: 79 BPM | DIASTOLIC BLOOD PRESSURE: 53 MMHG | OXYGEN SATURATION: 96 % | RESPIRATION RATE: 20 BRPM | SYSTOLIC BLOOD PRESSURE: 97 MMHG | TEMPERATURE: 97.2 F

## 2023-07-13 LAB
ATRIAL RATE: 127 BPM
P AXIS: 107 DEGREES
PR INTERVAL: 124 MS
QRS AXIS: 88 DEGREES
QRSD INTERVAL: 66 MS
QT INTERVAL: 304 MS
QTC INTERVAL: 441 MS
T WAVE AXIS: 174 DEGREES
VENTRICULAR RATE: 127 BPM

## 2023-07-13 PROCEDURE — 93010 ELECTROCARDIOGRAM REPORT: CPT | Performed by: INTERNAL MEDICINE

## 2023-07-13 RX ORDER — LOPERAMIDE HYDROCHLORIDE 2 MG/1
2 CAPSULE ORAL ONCE
Status: COMPLETED | OUTPATIENT
Start: 2023-07-13 | End: 2023-07-13

## 2023-07-13 RX ORDER — ONDANSETRON 4 MG/1
4 TABLET, ORALLY DISINTEGRATING ORAL EVERY 8 HOURS PRN
Qty: 12 TABLET | Refills: 0 | Status: SHIPPED | OUTPATIENT
Start: 2023-07-13 | End: 2023-08-12

## 2023-07-13 RX ORDER — DICYCLOMINE HCL 20 MG
20 TABLET ORAL EVERY 6 HOURS
Qty: 20 TABLET | Refills: 0 | Status: SHIPPED | OUTPATIENT
Start: 2023-07-13 | End: 2023-07-18

## 2023-07-13 RX ADMIN — LOPERAMIDE HYDROCHLORIDE 2 MG: 2 CAPSULE ORAL at 00:49

## 2023-07-13 RX ADMIN — SODIUM CHLORIDE 1000 ML: 0.9 INJECTION, SOLUTION INTRAVENOUS at 00:10

## 2023-07-13 RX ADMIN — DIPHENHYDRAMINE HYDROCHLORIDE 50 MG: 50 INJECTION, SOLUTION INTRAMUSCULAR; INTRAVENOUS at 00:02

## 2023-07-13 NOTE — ED NOTES
Provider at bedside to update and see pt. D/C summary expressed and provided via ER Provider.      Della Cabral RN  07/13/23 [id number]

## 2023-07-13 NOTE — ED NOTES
Alert oriented in no distress. No c/o offered. discharged to home with written instructions     Alex De Leon RN  07/13/23 [id number]

## 2023-07-13 NOTE — ED PROVIDER NOTES
History  Chief Complaint   Patient presents with   • Vomiting     New onset repeated episodes of vomiting, diarrhea with midsternal chest pain starting this AM. Not able to hold food or fluids down per parents. History provided by:  Patient  Vomiting  Severity:  Moderate  Duration:  1 day  Timing:  Constant  Quality:  Stomach contents  Progression:  Worsening  Chronicity:  New  Recent urination:  Decreased  Context: not post-tussive and not self-induced    Relieved by:  Nothing  Worsened by:  Nothing  Ineffective treatments:  None tried  Associated symptoms: abdominal pain (epigastric), chills, diarrhea and myalgias    Associated symptoms: no arthralgias    Associated symptoms comment:  Burning chest discomfort  Risk factors: no prior abdominal surgery, no suspect food intake and no travel to endemic areas        Prior to Admission Medications   Prescriptions Last Dose Informant Patient Reported? Taking?   drospirenone-ethinyl estradiol (SELENE) 3-0.03 MG per tablet   Yes No   esomeprazole (NexIUM) 20 mg capsule   No No   Sig: Take 1 capsule (20 mg total) by mouth in the morning      Facility-Administered Medications: None       Past Medical History:   Diagnosis Date   • Allergic    • Anxiety    • GERD (gastroesophageal reflux disease)    • Medical history reviewed with no changes        Past Surgical History:   Procedure Laterality Date   • EYE SURGERY      lazy eye       Family History   Problem Relation Age of Onset   • Glaucoma Mother    • Hyperlipidemia Mother         hypercholesterolemia   • Depression Mother    • Anxiety disorder Mother    • ETHAN disease Father    • Hyperlipidemia Father         hypercholesterolemia     I have reviewed and agree with the history as documented.     E-Cigarette/Vaping     E-Cigarette/Vaping Substances     Social History     Tobacco Use   • Smoking status: Never   • Smokeless tobacco: Never   Substance Use Topics   • Alcohol use: No   • Drug use: Never       Review of Systems   Constitutional: Positive for chills. Gastrointestinal: Positive for abdominal pain (epigastric), diarrhea and vomiting. Musculoskeletal: Positive for myalgias. Negative for arthralgias. All other systems reviewed and are negative. Physical Exam  Physical Exam  Vitals and nursing note reviewed. Constitutional:       General: She is not in acute distress. Appearance: She is well-developed. She is ill-appearing. She is not toxic-appearing or diaphoretic. HENT:      Head: Normocephalic and atraumatic. Nose: Nose normal.      Mouth/Throat:      Mouth: Mucous membranes are moist.   Eyes:      Conjunctiva/sclera: Conjunctivae normal.   Cardiovascular:      Rate and Rhythm: Regular rhythm. Tachycardia present. Heart sounds: Normal heart sounds. Pulmonary:      Effort: Pulmonary effort is normal. No respiratory distress. Breath sounds: Normal breath sounds. Abdominal:      Palpations: Abdomen is soft. Tenderness: There is abdominal tenderness (epigastric). There is no guarding or rebound. Musculoskeletal:         General: Normal range of motion. Cervical back: Normal range of motion and neck supple. Skin:     General: Skin is warm and dry. Neurological:      General: No focal deficit present. Mental Status: She is alert and oriented to person, place, and time. Mental status is at baseline.          Vital Signs  ED Triage Vitals   Temperature Pulse Respirations Blood Pressure SpO2   07/12/23 2032 07/12/23 2032 07/12/23 2032 07/12/23 2115 07/12/23 2032   (!) 97.2 °F (36.2 °C) (!) 132 22 105/62 98 %      Temp Source Heart Rate Source Patient Position - Orthostatic VS BP Location FiO2 (%)   07/12/23 2032 07/12/23 2032 07/12/23 2032 07/12/23 2032 --   Tympanic Monitor Sitting Left arm       Pain Score       07/12/23 2359       7           Vitals:    07/12/23 2115 07/12/23 2230 07/12/23 2300 07/13/23 0000   BP: 105/62 113/74 122/65 115/72   Pulse: 105 102 97 104 Patient Position - Orthostatic VS: Lying            Visual Acuity      ED Medications  Medications   sodium chloride 0.9 % bolus 1,000 mL (1,000 mL Intravenous New Bag 7/13/23 0010)   loperamide (IMODIUM) capsule 2 mg (has no administration in time range)   sodium chloride 0.9 % bolus 1,000 mL (0 mL Intravenous Stopped 7/12/23 2158)   ondansetron (ZOFRAN) injection 4 mg (4 mg Intravenous Given 7/12/23 2100)   iohexol (OMNIPAQUE) 350 MG/ML injection (SINGLE-DOSE) 100 mL (100 mL Intravenous Given 7/12/23 2242)   metoclopramide (REGLAN) injection 5 mg (5 mg Intravenous Given 7/12/23 2357)   diphenhydrAMINE (BENADRYL) injection 50 mg (50 mg Intravenous Given 7/13/23 0002)   ketorolac (TORADOL) injection 15 mg (15 mg Intramuscular Given 7/12/23 2359)       Diagnostic Studies  Results Reviewed     Procedure Component Value Units Date/Time    Comprehensive metabolic panel [[id number]]  (Abnormal) Collected: 07/12/23 2100    Lab Status: Final result Specimen: Blood from Arm, Left Updated: 07/12/23 2212     Sodium 137 mmol/L      Potassium 3.8 mmol/L      Chloride 105 mmol/L      CO2 17 mmol/L      ANION GAP 15 mmol/L      BUN 20 mg/dL      Creatinine 1.22 mg/dL      Glucose 166 mg/dL      Calcium 9.8 mg/dL      AST 18 U/L      ALT 18 U/L      Alkaline Phosphatase 48 U/L      Total Protein 8.3 g/dL      Albumin 5.0 g/dL      Total Bilirubin 0.70 mg/dL      eGFR 63 ml/min/1.73sq m     Narrative:      [place] guidelines for Chronic Kidney Disease (CKD):   •  Stage 1 with normal or high GFR (GFR > 90 mL/min/1.73 square meters)  •  Stage 2 Mild CKD (GFR = 60-89 mL/min/1.73 square meters)  •  Stage 3A Moderate CKD (GFR = 45-59 mL/min/1.73 square meters)  •  Stage 3B Moderate CKD (GFR = 30-44 mL/min/1.73 square meters)  •  Stage 4 Severe CKD (GFR = 15-29 mL/min/1.73 square meters)  •  Stage 5 End Stage CKD (GFR <15 mL/min/1.73 square meters)  Note: GFR calculation is accurate only with a steady state creatinine    Lipase [[id number]]  (Abnormal) Collected: 07/12/23 2100    Lab Status: Final result Specimen: Blood from Arm, Left Updated: 07/12/23 2212     Lipase 10 u/L     hCG, qualitative pregnancy [[id number]]  (Normal) Collected: 07/12/23 2100    Lab Status: Final result Specimen: Blood from Arm, Left Updated: 07/12/23 2144     Preg, Serum Negative    HS Troponin 0hr (reflex protocol) [[id number]]  (Normal) Collected: 07/12/23 2100    Lab Status: Final result Specimen: Blood from Arm, Left Updated: 07/12/23 2137     hs TnI 0hr <2 ng/L     D-Dimer [[id number]]  (Normal) Collected: 07/12/23 2100    Lab Status: Final result Specimen: Blood from Arm, Left Updated: 07/12/23 2123     D-Dimer, Quant 0.36 ug/ml FEU     CBC and differential [[id number]]  (Abnormal) Collected: 07/12/23 2100    Lab Status: Final result Specimen: Blood from Arm, Left Updated: 07/12/23 2109     WBC 14.19 Thousand/uL      RBC 5.52 Million/uL      Hemoglobin 17.1 g/dL      Hematocrit 47.4 %      MCV 86 fL      MCH 31.0 pg      MCHC 36.1 g/dL      RDW 11.8 %      MPV 9.0 fL      Platelets 443 Thousands/uL      nRBC 0 /100 WBCs      Neutrophils Relative 88 %      Immat GRANS % 0 %      Lymphocytes Relative 7 %      Monocytes Relative 5 %      Eosinophils Relative 0 %      Basophils Relative 0 %      Neutrophils Absolute 12.39 Thousands/µL      Immature Grans Absolute 0.05 Thousand/uL      Lymphocytes Absolute 0.92 Thousands/µL      Monocytes Absolute 0.74 Thousand/µL      Eosinophils Absolute 0.06 Thousand/µL      Basophils Absolute 0.03 Thousands/µL                  CT abdomen pelvis with contrast   Final Result by Rosa Grace MD (07/13 1781)      Findings suggesting enteritis and diarrheal illness, as described above. Please see discussion. No bowel obstruction.             Workstation performed: [id number]                    Procedures  ECG 12 Lead Documentation Only    Date/Time: 7/12/2023 9:50 PM    Performed by: Liv Renee MD  Authorized by: Brigitte Barclay MD    Indications / Diagnosis:  Chest pain  Rate:     ECG rate:  127    ECG rate assessment: tachycardic    Rhythm:     Rhythm: sinus tachycardia    T waves:     T waves: non-specific               ED Course  ED Course as of 07/13/23 0041 Wed Jul 12, 2023   2213 Pt with shaking extremities and anxious and getting chills with IVF, so pulse of with waveform at times intermittently picking up. Sat when with good pleth is 98%, SPO2 of 90% from motion artifact. Medical Decision Making  20yo female with history of IBS and some GERD along with PCOS who is on birth controls and tried Nexium for her GERD but then got hives. So only takes a birth control pill. In the past they started with epigastric abdominal pain with significant diffuse abdominal cramping and then has been having profuse watery diarrhea that is just running out of her that she cannot control and then has had numerous bouts of vomiting along with general diffuse body aches and feels like she has uncontrollable body shaking that she feels like she has been hit by a car. No actual fevers but has significant chills. Also has a burning in her chest.  She has never had any abdominal surgeries. Is due for her menstrual period and is on day 4 pack of the white pills of her birth control pill. No shortness of breath just a burning in her chest.    We will evaluation for the chest pain with EKG and will get troponins and D-dimer for elevated tachycardia and on birth control to restratify for PE. And then with abdominal pain will get CBC CMP lipase to rule out pancreatitis and other abdominal pathology like cholecystitis we will get pregnancy test to rule out pregnancy pathology but less likely since patient is on birth control. We will get CT scan to rule out surgical pathology.   May likely be viral gastroenteritis given profuse vomiting and diarrhea so we will treat symptoms with fluids and nausea medication and will reassess. Epigastric abdominal pain: acute illness or injury  Nausea vomiting and diarrhea: acute illness or injury  Amount and/or Complexity of Data Reviewed  Labs: ordered. Radiology: ordered. ECG/medicine tests: ordered and independent interpretation performed. Risk  Prescription drug management. Disposition  Final diagnoses:   Nausea vomiting and diarrhea   Epigastric abdominal pain     Time reflects when diagnosis was documented in both MDM as applicable and the Disposition within this note     Time User Action Codes Description Comment    7/13/2023 12:31 AM Dave BENITEZ Add [R11.2,  R19.7] Nausea vomiting and diarrhea     7/13/2023 12:31 AM Marcial Enriquez Add [R10.13] Epigastric abdominal pain       ED Disposition     ED Disposition   Discharge    Condition   Stable    Date/Time   Thu Jul 13, 2023 12:35 AM    Comment   Mónica Godoy discharge to home/self care. Follow-up Information     Follow up With Specialties Details Why Contact Info    Georga Hodgkin, Valerie[facility], Nurse Practitioner Go to  If symptoms worsen [address]   [address]  [address] [place] [address]  [phone]            Patient's Medications   Discharge Prescriptions    DICYCLOMINE (BENTYL) 20 MG TABLET    Take 1 tablet (20 mg total) by mouth every 6 (six) hours for 20 doses       Start Date: 7/13/2023 End Date: 7/18/2023       Order Dose: 20 mg       Quantity: 20 tablet    Refills: 0    ONDANSETRON (ZOFRAN-ODT) 4 MG DISINTEGRATING TABLET    Take 1 tablet (4 mg total) by mouth every 8 (eight) hours as needed for nausea or vomiting       Start Date: 7/13/2023 End Date: 8/12/2023       Order Dose: 4 mg       Quantity: 12 tablet    Refills: 0       No discharge procedures on file.     PDMP Review       Value Time User    PDMP Reviewed  Yes 12/22/2021  1:51 PM Yael Wiley, [address] [place] [address]          ED Provider  Electronically Signed by           Purnima Venegas [place]ann [place], [address] [place] [address]  07/13/23 7715

## 2023-07-27 ENCOUNTER — OFFICE VISIT (OUTPATIENT)
Dept: FAMILY MEDICINE CLINIC | Facility: CLINIC | Age: 23
End: 2023-07-27
Payer: COMMERCIAL

## 2023-07-27 VITALS
TEMPERATURE: 97.8 F | WEIGHT: 196.4 LBS | SYSTOLIC BLOOD PRESSURE: 102 MMHG | OXYGEN SATURATION: 97 % | BODY MASS INDEX: 29.09 KG/M2 | HEIGHT: 69 IN | HEART RATE: 84 BPM | DIASTOLIC BLOOD PRESSURE: 74 MMHG

## 2023-07-27 DIAGNOSIS — R11.2 NAUSEA VOMITING AND DIARRHEA: ICD-10-CM

## 2023-07-27 DIAGNOSIS — K52.9 ENTERITIS: Primary | ICD-10-CM

## 2023-07-27 DIAGNOSIS — R19.7 NAUSEA VOMITING AND DIARRHEA: ICD-10-CM

## 2023-07-27 DIAGNOSIS — R10.11 RIGHT UPPER QUADRANT PAIN: ICD-10-CM

## 2023-07-27 PROCEDURE — 99213 OFFICE O/P EST LOW 20 MIN: CPT | Performed by: NURSE PRACTITIONER

## 2023-07-27 RX ORDER — ONDANSETRON 4 MG/1
4 TABLET, ORALLY DISINTEGRATING ORAL EVERY 8 HOURS PRN
Qty: 12 TABLET | Refills: 0 | Status: SHIPPED | OUTPATIENT
Start: 2023-07-27 | End: 2023-08-26

## 2023-07-27 RX ORDER — DICYCLOMINE HCL 20 MG
20 TABLET ORAL EVERY 6 HOURS
Qty: 20 TABLET | Refills: 0 | Status: SHIPPED | OUTPATIENT
Start: 2023-07-27 | End: 2023-08-04

## 2023-07-27 NOTE — PROGRESS NOTES
Name: Nell Sampson      : 2000      MRN: [id number]  Encounter Provider: MIRANDA Renee  Encounter Date: 2023   Encounter department: [address] [place] [address]     1. Enteritis  Assessment & Plan:  Patient will have her stool studies and labs and has a f/u with GI in September and also will continue with diet limited and clear liquids and taking the Tagement     Orders:  -     Comprehensive metabolic panel; Future  -     CBC and differential; Future  -     H. pylori antigen, stool; Future  -     Stool Enteric Bacterial Panel by PCR; Future  -     Clostridium difficile toxin by PCR; Future  -     Occult Blood, Fecal Immunochemical; Future  -     Ova and parasite examination; Future  -     Lipase; Future  -     US right upper quadrant; Future; Expected date: 2023  -     Celiac Disease Antibody Profile; Future    2. Nausea vomiting and diarrhea  -     ondansetron (ZOFRAN-ODT) 4 mg disintegrating tablet; Take 1 tablet (4 mg total) by mouth every 8 (eight) hours as needed for nausea or vomiting  -     dicyclomine (BENTYL) 20 mg tablet; Take 1 tablet (20 mg total) by mouth every 6 (six) hours for 20 doses  -     US right upper quadrant; Future; Expected date: 2023  -     Celiac Disease Antibody Profile; Future    3. Right upper quadrant pain  -     US right upper quadrant; Future; Expected date: 2023           Subjective      Patient here today and reports that she has had some bloating and cramping in her abdomen and reports that she has been constipated Patient lost a few pounds. Patient had been to the ED and was diagnosed with enteritis and diagnosed on 2023. Patient taking the zofran as needed for nausea. Not getting sick at this point just limited diet eating small meals. She is having bowel movements and having constipation and not emptying out completely and having some hard stools and then diarrhea     Abdominal Pain  This is a chronic problem. The current episode started 1 to 4 weeks ago. The onset quality is sudden. The problem occurs every several days. The most recent episode lasted 2 hours. The problem has been waxing and waning since onset. The pain is located in the epigastric region and suprapubic region. The pain is at a severity of 7/10. The quality of the pain is described as cramping and sharp. Associated symptoms include anorexia, arthralgias, belching, constipation, diarrhea, myalgias, nausea and vomiting. Pertinent negatives include no dysuria, fever, flatus, frequency, headaches, hematochezia, hematuria, melena or sore throat. The symptoms are aggravated by bowel movement, drinking alcohol, eating and vomiting. Nothing relieves the symptoms. Prior diagnostic workup includes CT scan. Review of Systems   Constitutional: Negative for activity change, appetite change, chills, diaphoresis, fatigue, fever and unexpected weight change. HENT: Negative for congestion, ear pain, hearing loss, postnasal drip, sinus pressure, sinus pain, sneezing and sore throat. Eyes: Negative for pain, redness and visual disturbance. Respiratory: Negative for cough and shortness of breath. Cardiovascular: Negative for chest pain and leg swelling. Gastrointestinal: Positive for abdominal pain, anorexia, constipation, diarrhea, nausea and vomiting. Negative for flatus, hematochezia and melena. Endocrine: Negative. Genitourinary: Negative for dysuria, frequency and hematuria. Musculoskeletal: Positive for arthralgias and myalgias. Skin: Negative. Allergic/Immunologic: Negative. Neurological: Negative for dizziness, light-headedness and headaches. Hematological: Negative. Psychiatric/Behavioral: Negative for behavioral problems and dysphoric mood.        Current Outpatient Medications on File Prior to Visit   Medication Sig   • [DISCONTINUED] dicyclomine (BENTYL) 20 mg tablet Take 1 tablet (20 mg total) by mouth every 6 (six) hours for 20 doses   • [DISCONTINUED] drospirenone-ethinyl estradiol (SELENE) 3-0.03 MG per tablet    • [DISCONTINUED] ondansetron (ZOFRAN-ODT) 4 mg disintegrating tablet Take 1 tablet (4 mg total) by mouth every 8 (eight) hours as needed for nausea or vomiting (Patient not taking: Reported on 7/27/2023)       Objective     /74 (BP Location: Left arm, Patient Position: Sitting)   Pulse 84   Temp 97.8 °F (36.6 °C) (Temporal)   Ht 5' 9" (1.753 m)   Wt 89.1 kg (196 lb 6.4 oz)   SpO2 97%   BMI 29.00 kg/m²     Physical Exam  Vitals and nursing note reviewed. Constitutional:       General: She is not in acute distress. Appearance: She is well-developed. HENT:      Head: Normocephalic and atraumatic. Right Ear: Tympanic membrane normal.      Left Ear: Tympanic membrane normal.      Nose: Nose normal.      Mouth/Throat:      Mouth: Mucous membranes are moist.   Eyes:      Pupils: Pupils are equal, round, and reactive to light. Neck:      Thyroid: No thyromegaly. Cardiovascular:      Rate and Rhythm: Normal rate and regular rhythm. Heart sounds: Normal heart sounds. No murmur heard. Pulmonary:      Effort: Pulmonary effort is normal. No respiratory distress. Breath sounds: Normal breath sounds. No wheezing. Abdominal:      General: Bowel sounds are normal.      Palpations: Abdomen is soft. Musculoskeletal:         General: Normal range of motion. Cervical back: Normal range of motion. Skin:     General: Skin is warm and dry. Neurological:      Mental Status: She is alert and oriented to person, place, and time.    Psychiatric:         Mood and Affect: Mood normal.       MIRANDA Ch

## 2023-07-27 NOTE — ASSESSMENT & PLAN NOTE
Patient will have her stool studies and labs and has a f/u with GI in September and also will continue with diet limited and clear liquids and taking the Tagement

## 2023-07-29 DIAGNOSIS — R19.7 NAUSEA VOMITING AND DIARRHEA: ICD-10-CM

## 2023-07-29 DIAGNOSIS — R11.2 NAUSEA VOMITING AND DIARRHEA: ICD-10-CM

## 2023-07-30 LAB
ALBUMIN SERPL-MCNC: 4.6 G/DL (ref 4–5)
ALBUMIN/GLOB SERPL: 2.2 {RATIO} (ref 1.2–2.2)
ALP SERPL-CCNC: 57 IU/L (ref 44–121)
ALT SERPL-CCNC: 11 IU/L (ref 0–32)
AST SERPL-CCNC: 17 IU/L (ref 0–40)
BASOPHILS # BLD AUTO: 0 X10E3/UL (ref 0–0.2)
BASOPHILS NFR BLD AUTO: 1 %
BILIRUB SERPL-MCNC: 0.5 MG/DL (ref 0–1.2)
BUN SERPL-MCNC: 10 MG/DL (ref 6–20)
BUN/CREAT SERPL: 10 (ref 9–23)
CALCIUM SERPL-MCNC: 9.4 MG/DL (ref 8.7–10.2)
CHLORIDE SERPL-SCNC: 105 MMOL/L (ref 96–106)
CO2 SERPL-SCNC: 23 MMOL/L (ref 20–29)
CREAT SERPL-MCNC: 1 MG/DL (ref 0.57–1)
EGFR: 82 ML/MIN/1.73
ENDOMYSIUM IGA SER QL: NEGATIVE
EOSINOPHIL # BLD AUTO: 0.1 X10E3/UL (ref 0–0.4)
EOSINOPHIL NFR BLD AUTO: 3 %
ERYTHROCYTE [DISTWIDTH] IN BLOOD BY AUTOMATED COUNT: 12.5 % (ref 11.7–15.4)
GLOBULIN SER-MCNC: 2.1 G/DL (ref 1.5–4.5)
GLUCOSE SERPL-MCNC: 88 MG/DL (ref 70–99)
HCT VFR BLD AUTO: 43.6 % (ref 34–46.6)
HGB BLD-MCNC: 14.5 G/DL (ref 11.1–15.9)
IGA SERPL-MCNC: 180 MG/DL (ref 87–352)
IMM GRANULOCYTES # BLD: 0 X10E3/UL (ref 0–0.1)
IMM GRANULOCYTES NFR BLD: 0 %
LIPASE SERPL-CCNC: 23 U/L (ref 14–72)
LYMPHOCYTES # BLD AUTO: 1.7 X10E3/UL (ref 0.7–3.1)
LYMPHOCYTES NFR BLD AUTO: 42 %
MCH RBC QN AUTO: 30.3 PG (ref 26.6–33)
MCHC RBC AUTO-ENTMCNC: 33.3 G/DL (ref 31.5–35.7)
MCV RBC AUTO: 91 FL (ref 79–97)
MONOCYTES # BLD AUTO: 0.4 X10E3/UL (ref 0.1–0.9)
MONOCYTES NFR BLD AUTO: 11 %
NEUTROPHILS # BLD AUTO: 1.7 X10E3/UL (ref 1.4–7)
NEUTROPHILS NFR BLD AUTO: 43 %
PLATELET # BLD AUTO: 253 X10E3/UL (ref 150–450)
POTASSIUM SERPL-SCNC: 3.8 MMOL/L (ref 3.5–5.2)
PROT SERPL-MCNC: 6.7 G/DL (ref 6–8.5)
RBC # BLD AUTO: 4.79 X10E6/UL (ref 3.77–5.28)
SODIUM SERPL-SCNC: 142 MMOL/L (ref 134–144)
TTG IGA SER-ACNC: <2 U/ML (ref 0–3)
WBC # BLD AUTO: 4 X10E3/UL (ref 3.4–10.8)

## 2023-07-31 ENCOUNTER — HOSPITAL ENCOUNTER (OUTPATIENT)
Dept: ULTRASOUND IMAGING | Facility: HOSPITAL | Age: 23
Discharge: HOME | End: 2023-07-31
Payer: COMMERCIAL

## 2023-07-31 DIAGNOSIS — R10.11 RIGHT UPPER QUADRANT PAIN: ICD-10-CM

## 2023-07-31 DIAGNOSIS — K52.9 ENTERITIS: ICD-10-CM

## 2023-07-31 DIAGNOSIS — R11.2 NAUSEA VOMITING AND DIARRHEA: ICD-10-CM

## 2023-07-31 DIAGNOSIS — R19.7 NAUSEA VOMITING AND DIARRHEA: ICD-10-CM

## 2023-07-31 PROCEDURE — 76705 ECHO EXAM OF ABDOMEN: CPT

## 2023-07-31 RX ORDER — DICYCLOMINE HCL 20 MG
TABLET ORAL
Qty: 20 TABLET | Refills: 0 | OUTPATIENT
Start: 2023-07-31

## 2023-08-03 DIAGNOSIS — A07.8 BLASTOCYSTIS HOMINIS INFECTION: Primary | ICD-10-CM

## 2023-08-03 LAB
C DIFF TOX A+B STL QL IA: NEGATIVE
CAMPYLOBACTER STL CULT: NORMAL
H PYLORI AG STL QL IA: NEGATIVE
HEMOCCULT STL QL IA: NEGATIVE
Lab: ABNORMAL
Lab: NORMAL
Lab: NORMAL
O+P STL CONC: ABNORMAL
SALM + SHIG STL CULT: NORMAL
SL AMB E COLI SHIGA TOXIN EIA: NEGATIVE

## 2023-08-03 RX ORDER — METRONIDAZOLE 500 MG/1
500 TABLET ORAL EVERY 8 HOURS SCHEDULED
Qty: 30 TABLET | Refills: 0 | Status: SHIPPED | OUTPATIENT
Start: 2023-08-03 | End: 2023-08-13

## 2023-08-04 ENCOUNTER — TELEMEDICINE (OUTPATIENT)
Dept: PSYCHIATRY | Facility: CLINIC | Age: 23
End: 2023-08-04
Payer: COMMERCIAL

## 2023-08-04 DIAGNOSIS — F41.1 GENERALIZED ANXIETY DISORDER WITH PANIC ATTACKS: Primary | ICD-10-CM

## 2023-08-04 DIAGNOSIS — F41.0 GENERALIZED ANXIETY DISORDER WITH PANIC ATTACKS: Primary | ICD-10-CM

## 2023-08-04 PROCEDURE — 99213 OFFICE O/P EST LOW 20 MIN: CPT

## 2023-08-04 RX ORDER — ESCITALOPRAM OXALATE 10 MG/1
10 TABLET ORAL DAILY
Qty: 90 TABLET | Refills: 0 | Status: SHIPPED | OUTPATIENT
Start: 2023-08-04

## 2023-08-04 NOTE — BH TREATMENT PLAN
TREATMENT PLAN (Medication Management Only)        [address] [facility]    Name and Date of Birth:  Lisa Cook 25 y.o. 2000  Date of Treatment Plan: August 4, 2023  Diagnosis/Diagnoses:    1. Generalized anxiety disorder with panic attacks      Strengths/Personal Resources for Self-Care: supportive family, supportive friends, taking medications as prescribed, sense of humor. Area/Areas of need (in own words): anxiety symptoms  1. Long Term Goal: continue improvement in acceptable anxiety level. Target Date:6 months - 2/4/2024  Person/Persons responsible for completion of goal: Mónica  2. Short Term Objective (s) - How will we reach this goal?:   A. Provider new recommended medication/dosage changes and/or continue medication(s): continue current medications as prescribed. B. Take psychiatric medications responsibly. C. Attend medication management appointments regularly. Target Date:6 months - 2/4/2024  Person/Persons Responsible for Completion of Goal: Mónica  Progress Towards Goals: continuing treatment  Treatment Modality: medication management every 3 months  Review due 180 days from date of this plan: 6 months - 2/4/2024  Expected length of service: ongoing treatment  My Physician/PA/NP and I have developed this plan together and I agree to work on the goals and objectives. I understand the treatment goals that were developed for my treatment.

## 2023-08-04 NOTE — PSYCH
Virtual Regular Visit    Verification of patient location:    Patient is located in the following state in which I hold an active license [place]    Problem List Items Addressed This Visit    None  Visit Diagnoses     Generalized anxiety disorder with panic attacks    -  Primary             Encounter provider MIRANDA Friedman    Provider located at    [address] [address] [facility] [id number] [phone]    Recent Visits  No visits were found meeting these conditions. Showing recent visits within past 7 days and meeting all other requirements  Today's Visits  Date Type Provider Dept   08/04/23 Telemedicine MIRANDA Friedman  [facility]   Showing today's visits and meeting all other requirements  Future Appointments  No visits were found meeting these conditions. Showing future appointments within next 150 days and meeting all other requirements         The patient was identified by name and date of birth. Melba Later was informed that this is a telemedicine visit and [facility] She agrees to proceed. .  My office door was closed. No one else was in the room. She acknowledged consent and understanding of privacy and security of the video platform. The patient has agreed to participate and understands they can discontinue the visit at any time. Patient is aware this is a billable service.      HPI     Current Outpatient Medications   Medication Sig Dispense Refill   • metroNIDAZOLE (FLAGYL) 500 mg tablet Take 1 tablet (500 mg total) by mouth every 8 (eight) hours for 10 days 30 tablet 0   • ondansetron (ZOFRAN-ODT) 4 mg disintegrating tablet Take 1 tablet (4 mg total) by mouth every 8 (eight) hours as needed for nausea or vomiting 12 tablet 0   • dicyclomine (BENTYL) 20 mg tablet Take 1 tablet (20 mg total) by mouth every 6 (six) hours for 20 doses 20 tablet 0 No current facility-administered medications for this visit. Review of Systems  Video Exam    There were no vitals filed for this visit. Physical Exam   As a result of this visit, I have referred the patient for further respiratory evaluation. No      VIRTUAL VISIT DISCLAIMER    Mónica Schmidt acknowledges that she has consented to an online visit or consultation. She understands that the online visit is based solely on information provided by her, and that, in the absence of a face-to-face physical evaluation by the physician, the diagnosis she receives is both limited and provisional in terms of accuracy and completeness. This is not intended to replace a full medical face-to-face evaluation by the physician. Mónica Godoy understands and accepts these terms. MEDICATION MANAGEMENT NOTE        [address] [facility]    Name and Date of Birth:  César Parker 25 y.o. 2000 MRN: [id number]    Date of Visit: August 4, 2023    Allergies   Allergen Reactions   • Allyl Isothiocyanate Hives   • Milk (Cow) Diarrhea     Mom states hyper sensitive to milk products   • Nexium [Esomeprazole] Hives     SUBJECTIVE:    Mónica is seen today for a follow up for Generalized Anxiety Disorder. She reports that she continues to do relatively well since the last visit. Patient had previously stopped her Lexapro 10 mg daily at the beginning of this year due to feeling she did not need it any longer for anxiety management. Made this appointment today to hopefully restart Lexapro as she believes she would benefit from restarting due to daily anxiety over her chronic health issues. Describes 4/10 anxiety with frequent worrying regarding her health, racing thoughts, difficulty falling asleep. Denies severe anxiety or panic attacks. Believes Lexapro improves her quality of life and would like to resume, tolerated with no side effects in the past and medication sent to her pharmacy of choice. Continues to work on [email or web address] at [place] and is currently working full-time as well. Mood is pleasant and euthymic on approach. Denies depression. Denies SI. She denies any side effects from medications. PLAN:    -Re-start Lexapro 10 mg daily for chronic anxiety regarding health-IBS, GERD  PARQ completed including serotonin syndrome, SIADH, worsening depression, suicidality, induction of aniceto, GI upset, headaches, activation, sexual side effects, sedation, potential drug interactions, and others. Aware of 24 hour and weekend coverage for urgent situations accessed by calling [facility] main practice number  Continue psychotherapy with therapist    Diagnoses and all orders for this visit:    Generalized anxiety disorder with panic attacks        Current Outpatient Medications on File Prior to Visit   Medication Sig Dispense Refill   • metroNIDAZOLE (FLAGYL) 500 mg tablet Take 1 tablet (500 mg total) by mouth every 8 (eight) hours for 10 days 30 tablet 0   • ondansetron (ZOFRAN-ODT) 4 mg disintegrating tablet Take 1 tablet (4 mg total) by mouth every 8 (eight) hours as needed for nausea or vomiting 12 tablet 0   • dicyclomine (BENTYL) 20 mg tablet Take 1 tablet (20 mg total) by mouth every 6 (six) hours for 20 doses 20 tablet 0     No current facility-administered medications on file prior to visit. Psychotherapy Provided:       Supportive counseling provided. Medication changes discussed with Mónica. Medication education provided to Mónica regarding lexapro     HPI ROS Appetite Changes and Sleep:     She reports normal sleep, adequate appetite, adequate energy level.  Denies homicidal ideation, denies suicidal ideation    Review Of Systems:      HPI ROS:               Medication Side Effects:  denies    Depression (10 worst): 0/10    Anxiety (10 worst): 4/10    Safety concerns (SI, HI, etc): Denies si and hi    Sleep: 7 hrs    Energy: fair    Appetite: 2-3 meals    Weight Change: denies        Mental Status Evaluation:    Appearance Adequate hygiene and grooming   Behavior calm and cooperative   Mood euthymic  Depression Scale - 0 of 10 (0 = No depression)  Anxiety Scale - 4 of 10 (0 = No anxiety)   Speech Normal rate and volume   Affect appropriate   Thought Processes Goal directed and coherent   Thought Content Does not verbalize delusional material   Associations Tightly connected   Perceptual Disturbances Denies hallucinations and does not appear to be responding to internal stimuli   Risk Potential Suicidal/Homicidal Ideation - No evidence of suicidal or homicidal ideation and patient does not verbalize suicidal or homicidal ideation  Risk of Violence - No evidence of risk for violence found on assessment  Risk of Self Mutilation - No evidence of risk for self mutilation found on assessment   Orientation oriented to person, place, time/date and situation   Memory recent and remote memory grossly intact   Consciousness alert and awake   Attention/Concentration attention span and concentration appear shorter than expected for age   Insight intact   Judgement intact   Muscle Strength and Gait normal muscle strength and normal muscle tone, normal gait/station and normal balance   Motor Activity no abnormal movements   Language no difficulty naming common objects, no difficulty repeating a phrase, no difficulty writing a sentence   Fund of Knowledge adequate knowledge of current events  adequate fund of knowledge regarding past history  adequate fund of knowledge regarding vocabulary          Past Medical History:    Past Medical History:   Diagnosis Date   • Allergic    • Anxiety    • GERD (gastroesophageal reflux disease)    • Medical history reviewed with no changes         Past Surgical History:   Procedure Laterality Date   • EYE SURGERY      lazy eye     Allergies   Allergen Reactions   • Allyl Isothiocyanate Hives   • Milk (Cow) Diarrhea     Mom states hyper sensitive to milk products   • Nexium [Esomeprazole] Hives     Substance Abuse History:    Social History     Substance and Sexual Activity   Alcohol Use No     Social History     Substance and Sexual Activity   Drug Use Never     Social History:    Social History     Socioeconomic History   • Marital status: Single     Spouse name: Not on file   • Number of children: Not on file   • Years of education: Not on file   • Highest education level: Not on file   Occupational History   • Not on file   Tobacco Use   • Smoking status: Never   • Smokeless tobacco: Never   Substance and Sexual Activity   • Alcohol use: No   • Drug use: Never   • Sexual activity: Not Currently     Partners: Male     Birth control/protection: Condom Male   Other Topics Concern   • Not on file   Social History Narrative    Denied:  Caffeine use     Social Determinants of Health     Financial Resource Strain: Not on file   Food Insecurity: Not on file   Transportation Needs: Not on file   Physical Activity: Not on file   Stress: Not on file   Social Connections: Not on file   Intimate Partner Violence: Not on file   Housing Stability: Not on file     Family Psychiatric History:     Family History   Problem Relation Age of Onset   • Glaucoma Mother    • Hyperlipidemia Mother         hypercholesterolemia   • Depression Mother    • Anxiety disorder Mother    • ETHAN disease Father    • Hyperlipidemia Father         hypercholesterolemia     History Review: The following portions of the patient's history were reviewed and updated as appropriate: allergies, current medications, past family history, past medical history, past social history, past surgical history and problem list     OBJECTIVE:     Vital signs in last 24 hours: There were no vitals filed for this visit.   Laboratory Results:   Recent Labs (last 2 months):   Orders Only on 07/28/2023   Component Date Value   • Salmonella/Shigella Scre* 07/28/2023 Final report    • Campylobacter Culture 07/28/2023 Final report    • E Coli Shiga Toxin EIA 07/28/2023 Negative    • Result 1 07/28/2023 Comment    • Result 1 07/28/2023 Comment    • C difficile Toxins A+B, * 07/28/2023 Negative    • Occult Blood, Fecal IA 07/28/2023 Negative    • Ova + Parasite Exam 07/28/2023 Final report (A)    • Result 1 07/28/2023 Comment (A)    • H pylori Ag, Stl 07/28/2023 Negative    Orders Only on 07/28/2023   Component Date Value   • Endomysial IgA 07/28/2023 Negative    • TISSUE TRANSGLUTAMINASE * 07/28/2023 <2    • IgA 07/28/2023 180    • White Blood Cell Count 07/28/2023 4.0    • Red Blood Cell Count 07/28/2023 4.79    • Hemoglobin 07/28/2023 14.5    • HCT 07/28/2023 43.6    • MCV 07/28/2023 91    • MCH 07/28/2023 30.3    • MCHC 07/28/2023 33.3    • RDW 07/28/2023 12.5    • Platelet Count 35/72/9386 253    • Neutrophils 07/28/2023 43    • Lymphocytes 07/28/2023 42    • Monocytes 07/28/2023 11    • Eosinophils 07/28/2023 3    • Basophils PCT 07/28/2023 1    • Neutrophils (Absolute) 07/28/2023 1.7    • Lymphocytes (Absolute) 07/28/2023 1.7    • Monocytes (Absolute) 07/28/2023 0.4    • Eosinophils (Absolute) 07/28/2023 0.1    • Basophils ABS 07/28/2023 0.0    • Immature Granulocytes 07/28/2023 0    • Immature Granulocytes (A* 07/28/2023 0.0    • Glucose, Random 07/28/2023 88    • BUN 07/28/2023 10    • Creatinine 07/28/2023 1.00    • eGFR 07/28/2023 82    • SL AMB BUN/CREATININE RA* 07/28/2023 10    • Sodium 07/28/2023 142    • Potassium 07/28/2023 3.8    • Chloride 07/28/2023 105    • CO2 07/28/2023 23    • CALCIUM 07/28/2023 9.4    • Protein, Total 07/28/2023 6.7    • Albumin 07/28/2023 4.6    • Globulin, Total 07/28/2023 2.1    • Albumin/Globulin Ratio 07/28/2023 2.2    • TOTAL BILIRUBIN 07/28/2023 0.5    • Alk Phos Isoenzymes 07/28/2023 57    • AST 07/28/2023 17    • ALT 07/28/2023 11    • Lipase, Serum 07/28/2023 23    Admission on 07/12/2023, Discharged on 07/13/2023   Component Date Value   • Ventricular Rate 07/12/2023 127    • Atrial Rate 07/12/2023 127    • GA Interval 07/12/2023 124    • QRSD Interval 07/12/2023 66    • QT Interval 07/12/2023 304    • QTC Interval 07/12/2023 441    • P Axis 07/12/2023 107    • QRS Axis 07/12/2023 88    • T Wave Axis 07/12/2023 174    • WBC 07/12/2023 14.19 (H)    • RBC 07/12/2023 5.52 (H)    • Hemoglobin 07/12/2023 17.1 (H)    • Hematocrit 07/12/2023 47.4 (H)    • MCV 07/12/2023 86    • MCH 07/12/2023 31.0    • MCHC 07/12/2023 36.1    • RDW 07/12/2023 11.8    • MPV 07/12/2023 9.0    • Platelets 27/92/7054 266    • nRBC 07/12/2023 0    • Neutrophils Relative 07/12/2023 88 (H)    • Immat GRANS % 07/12/2023 0    • Lymphocytes Relative 07/12/2023 7 (L)    • Monocytes Relative 07/12/2023 5    • Eosinophils Relative 07/12/2023 0    • Basophils Relative 07/12/2023 0    • Neutrophils Absolute 07/12/2023 12.39 (H)    • Immature Grans Absolute 07/12/2023 0.05    • Lymphocytes Absolute 07/12/2023 0.92    • Monocytes Absolute 07/12/2023 0.74    • Eosinophils Absolute 07/12/2023 0.06    • Basophils Absolute 07/12/2023 0.03    • Sodium 07/12/2023 137    • Potassium 07/12/2023 3.8    • Chloride 07/12/2023 105    • CO2 07/12/2023 17 (L)    • ANION GAP 07/12/2023 15    • BUN 07/12/2023 20    • Creatinine 07/12/2023 1.22    • Glucose 07/12/2023 166 (H)    • Calcium 07/12/2023 9.8    • AST 07/12/2023 18    • ALT 07/12/2023 18    • Alkaline Phosphatase 07/12/2023 48    • Total Protein 07/12/2023 8.3    • Albumin 07/12/2023 5.0    • Total Bilirubin 07/12/2023 0.70    • eGFR 07/12/2023 63    • Lipase 07/12/2023 10 (L)    • Preg, Serum 07/12/2023 Negative    • hs TnI 0hr 07/12/2023 <2    • D-Dimer, Quant 07/12/2023 0.36      I have personally reviewed all pertinent laboratory/tests results.     Suicide/Homicide Risk Assessment:    Risk of Harm to Self:  Protective Factors: no current suicidal ideation, access to mental health treatment, compliant with medications, compliant with mental health treatment, connection to community, having a desire to be alive, having a desire to live, having a sense of purpose or meaning in life, healthy fear of risky behaviors and pain, impulse control  Based on today's assessment, Mónica presents the following risk of harm to self: minimal    Risk of Harm to Others:  Based on today's assessment, Mónica presents the following risk of harm to others: none    The following interventions are recommended: referral for psychotherapy    Medications Risks/Benefits:      Risks, Benefits And Possible Side Effects Of Medications:    Discussed risks and benefits of treatment with patient including risk of suicidality, serotonin syndrome, increased QTc interval and SIADH related to treatment with antidepressants; Risk of induction of manic symptoms in certain patient populations     Controlled Medication Discussion:     Not applicable    Treatment Plan:    Due for update/Updated:   yes  Last treatment plan done 8/4/23 by nata castro. Treatment Plan due on 2/4/24. MIRANDA Holt 08/04/23    This note was shared with patient.    Visit Time    Visit Start Time: 11  Visit Stop Time: 11:20  Total Visit Duration: 20 minutes

## 2023-08-10 RX ORDER — DROSPIRENONE AND ETHINYL ESTRADIOL 0.03MG-3MG
1 KIT ORAL DAILY
COMMUNITY
Start: 2023-06-14 | End: 2024-06-13

## 2023-08-15 ENCOUNTER — OFFICE VISIT (OUTPATIENT)
Dept: GASTROENTEROLOGY | Facility: CLINIC | Age: 23
End: 2023-08-15
Payer: COMMERCIAL

## 2023-08-15 VITALS
SYSTOLIC BLOOD PRESSURE: 105 MMHG | HEART RATE: 85 BPM | OXYGEN SATURATION: 97 % | BODY MASS INDEX: 28.44 KG/M2 | WEIGHT: 192 LBS | HEIGHT: 69 IN | DIASTOLIC BLOOD PRESSURE: 70 MMHG

## 2023-08-15 DIAGNOSIS — K59.00 CONSTIPATION, UNSPECIFIED CONSTIPATION TYPE: ICD-10-CM

## 2023-08-15 DIAGNOSIS — R10.30 LOWER ABDOMINAL PAIN: ICD-10-CM

## 2023-08-15 DIAGNOSIS — R11.0 NAUSEA: Primary | ICD-10-CM

## 2023-08-15 DIAGNOSIS — R10.13 EPIGASTRIC PAIN: ICD-10-CM

## 2023-08-15 PROCEDURE — 99213 OFFICE O/P EST LOW 20 MIN: CPT | Performed by: PHYSICIAN ASSISTANT

## 2023-08-15 RX ORDER — FAMOTIDINE 40 MG/1
40 TABLET, FILM COATED ORAL DAILY
Qty: 30 TABLET | Refills: 3 | Status: SHIPPED | OUTPATIENT
Start: 2023-08-15

## 2023-08-15 RX ORDER — MEDROXYPROGESTERONE ACETATE 10 MG/1
TABLET ORAL
COMMUNITY
Start: 2023-08-10

## 2023-08-15 NOTE — PATIENT INSTRUCTIONS
Scheduled date of EGD/colonoscopy (as of today):9/9/23  Physician performing EGD/colonoscopy:Lani  Location of EGD/colonoscopy:[facility]  Desired bowel prep reviewed with patient:Julieta/Miralax  Instructions reviewed with patient by:Tiburcio braga  Clearances:   none

## 2023-08-15 NOTE — PROGRESS NOTES
[facility] - Outpatient Follow-up Note  Mónica Linn 26 y.o. female MRN: [id number]  Encounter: [id number]          ASSESSMENT AND PLAN:      1. Nausea  2. Lower abdominal pain  3. Epigastric pain  4. Constipation, unspecified constipation type  -Patient will be scheduled for an EGD and colonoscopy with biopsy.  -Patient will begin Pepcid 40 mg daily.  -Recommended 1 capful of MiraLAX daily.  -Continue to utilize dicyclomine as needed.  -Continue Florastor probiotic.    -Follow-up after endoscopic evaluation is complete.  ______________________________________________________________________    SUBJECTIVE:   71-year-old female with a past medical history significant for anxiety, GERD, IBS presents to the GI clinic today for follow-up. Patient reports over the past year she has really been suffering with her gastrointestinal system. Most recently in July she did suffer from a gastroenteritis and unfortunately has still been dealing with ongoing symptoms. She is reporting episodes of abdominal pressure and cramping. She is reporting constipation and nausea. She reports significant bloating. She reports that pretty much anything she eats or drinks will trigger her symptoms. She reports that she is eating plenty of fiber and drinking plenty of water throughout the course of the day. She is currently maintained on Florastor. She reports that this is the only thing that she is really taking consistently except her Lexapro. She is reporting episodes of nausea and vomiting but the Zofran has helped the vomiting but not the nausea. She reports she does utilize Pepto-Bismol occasionally as well. Patient's last upper endoscopy was in 2019 and this was a normal examination. Patient has never had a colonoscopy. CTAP from 7/12/2023 showed findings consistent with enteritis.   Right upper quadrant ultrasound from 7/31/2023 was normal.  Stool H. pylori negative  Fecal occult blood negative  Patient did have a stool culture consistent with Blastocystis hominis  C. difficile negative  Celiac panel negative    REVIEW OF SYSTEMS IS OTHERWISE NEGATIVE. Historical Information   Past Medical History:   Diagnosis Date   • Allergic    • Anxiety    • GERD (gastroesophageal reflux disease)    • Medical history reviewed with no changes      Past Surgical History:   Procedure Laterality Date   • EYE SURGERY      lazy eye     Social History   Social History     Substance and Sexual Activity   Alcohol Use No     Social History     Substance and Sexual Activity   Drug Use Never     Social History     Tobacco Use   Smoking Status Never   Smokeless Tobacco Never     Family History   Problem Relation Age of Onset   • Glaucoma Mother    • Hyperlipidemia Mother         hypercholesterolemia   • Depression Mother    • Anxiety disorder Mother    • ETHAN disease Father    • Hyperlipidemia Father         hypercholesterolemia       Meds/Allergies       Current Outpatient Medications:   •  dicyclomine (BENTYL) 20 mg tablet  •  escitalopram (Lexapro) 10 mg tablet  •  famotidine (PEPCID) 40 MG tablet  •  medroxyPROGESTERone (PROVERA) 10 mg tablet  •  ondansetron (ZOFRAN-ODT) 4 mg disintegrating tablet  •  drospirenone-ethinyl estradiol (SELENE) 3-0.03 MG per tablet    Allergies   Allergen Reactions   • Allyl Isothiocyanate Hives   • Milk (Cow) Diarrhea     Mom states hyper sensitive to milk products   • Nexium [Esomeprazole] Hives           Objective     Blood pressure 105/70, pulse 85, height 5' 9" (1.753 m), weight 87.1 kg (192 lb), SpO2 97 %. Body mass index is 28.35 kg/m². PHYSICAL EXAM:      General Appearance:   Alert, cooperative, no distress   HEENT:   Normocephalic, atraumatic, anicteric.     Neck:  Supple, symmetrical, trachea midline   Lungs:   Clear to auscultation bilaterally; no rales, rhonchi or wheezing; respirations unlabored    Heart[de-identified]   Regular rate and rhythm; no murmur, rub, or gallop. Abdomen:   Soft, non-tender, non-distended; normal bowel sounds; no masses, no organomegaly    Genitalia:   Deferred    Rectal:   Deferred    Extremities:  No cyanosis, clubbing or edema    Pulses:  2+ and symmetric    Skin:  No jaundice, rashes, or lesions    Lymph nodes:  No palpable cervical lymphadenopathy        Lab Results:   No visits with results within 1 Day(s) from this visit. Latest known visit with results is:   Orders Only on 07/28/2023   Component Date Value   • Salmonella/Shigella Scre* 07/28/2023 Final report    • Campylobacter Culture 07/28/2023 Final report    • E Coli Shiga Toxin EIA 07/28/2023 Negative    • Result 1 07/28/2023 Comment    • Result 1 07/28/2023 Comment    • C difficile Toxins A+B, * 07/28/2023 Negative    • Occult Blood, Fecal IA 07/28/2023 Negative    • Ova + Parasite Exam 07/28/2023 Final report (A)    • Result 1 07/28/2023 Comment (A)    • H pylori Ag, Stl 07/28/2023 Negative          Radiology Results:   US right upper quadrant    Result Date: 8/1/2023  Narrative: RIGHT UPPER QUADRANT ULTRASOUND INDICATION:     K52.9: Noninfective gastroenteritis and colitis, unspecified R11.2: Nausea with vomiting, unspecified R19.7: Diarrhea, unspecified R10.11: Right upper quadrant pain. COMPARISON:  None TECHNIQUE:   Real-time ultrasound of the right upper quadrant was performed with a curvilinear transducer with both volumetric sweeps and still imaging techniques. FINDINGS: PANCREAS:  Visualized portions of the pancreas are within normal limits. AORTA AND IVC:  Visualized portions are normal for patient age. LIVER: Size:  Within normal range. The liver measures 16.6 cm in the midclavicular line. Contour:  Surface contour is smooth. Parenchyma:  Echogenicity and echotexture are within normal limits. No liver mass identified. Limited imaging of the main portal vein shows it to be patent and hepatopetal. BILIARY: No gallbladder findings. No intrahepatic biliary dilatation.  CBD measures 2. 0 mm. No choledocholithiasis. KIDNEY: Right kidney measures 10.0 x 5.3 x 5.5 cm. Volume 151.3 mL Kidney within normal limits. ASCITES:   None.      Impression: Normal. Workstation performed: [id number]

## 2023-09-18 DIAGNOSIS — R19.7 NAUSEA VOMITING AND DIARRHEA: ICD-10-CM

## 2023-09-18 DIAGNOSIS — R11.2 NAUSEA VOMITING AND DIARRHEA: ICD-10-CM

## 2023-09-18 RX ORDER — ONDANSETRON 4 MG/1
4 TABLET, ORALLY DISINTEGRATING ORAL EVERY 8 HOURS PRN
Qty: 90 TABLET | Refills: 0 | Status: SHIPPED | OUTPATIENT
Start: 2023-09-18 | End: 2023-10-18

## 2023-09-21 DIAGNOSIS — R10.13 EPIGASTRIC PAIN: ICD-10-CM

## 2023-09-21 DIAGNOSIS — R11.0 NAUSEA: ICD-10-CM

## 2023-09-21 RX ORDER — FAMOTIDINE 40 MG/1
40 TABLET, FILM COATED ORAL DAILY
Qty: 90 TABLET | Refills: 1 | Status: SHIPPED | OUTPATIENT
Start: 2023-09-21

## 2023-10-16 RX ORDER — SODIUM CHLORIDE, SODIUM LACTATE, POTASSIUM CHLORIDE, CALCIUM CHLORIDE 600; 310; 30; 20 MG/100ML; MG/100ML; MG/100ML; MG/100ML
125 INJECTION, SOLUTION INTRAVENOUS CONTINUOUS
Status: CANCELLED | OUTPATIENT
Start: 2023-10-16

## 2023-10-17 ENCOUNTER — HOSPITAL ENCOUNTER (OUTPATIENT)
Dept: GASTROENTEROLOGY | Facility: HOSPITAL | Age: 23
Setting detail: OUTPATIENT SURGERY
Discharge: HOME | End: 2023-10-17
Admitting: INTERNAL MEDICINE
Payer: COMMERCIAL

## 2023-10-17 ENCOUNTER — ANESTHESIA EVENT (OUTPATIENT)
Dept: GASTROENTEROLOGY | Facility: HOSPITAL | Age: 23
End: 2023-10-17

## 2023-10-17 ENCOUNTER — ANESTHESIA (OUTPATIENT)
Dept: GASTROENTEROLOGY | Facility: HOSPITAL | Age: 23
End: 2023-10-17

## 2023-10-17 VITALS
BODY MASS INDEX: 28.44 KG/M2 | TEMPERATURE: 97.8 F | WEIGHT: 192.02 LBS | HEART RATE: 72 BPM | SYSTOLIC BLOOD PRESSURE: 104 MMHG | RESPIRATION RATE: 19 BRPM | HEIGHT: 69 IN | DIASTOLIC BLOOD PRESSURE: 59 MMHG | OXYGEN SATURATION: 99 %

## 2023-10-17 DIAGNOSIS — K59.00 CONSTIPATION, UNSPECIFIED CONSTIPATION TYPE: ICD-10-CM

## 2023-10-17 DIAGNOSIS — R10.13 EPIGASTRIC PAIN: ICD-10-CM

## 2023-10-17 DIAGNOSIS — R10.30 LOWER ABDOMINAL PAIN: ICD-10-CM

## 2023-10-17 DIAGNOSIS — R11.0 NAUSEA: ICD-10-CM

## 2023-10-17 LAB
EXT PREGNANCY TEST URINE: NEGATIVE
EXT. CONTROL: NORMAL

## 2023-10-17 PROCEDURE — 81025 URINE PREGNANCY TEST: CPT | Performed by: STUDENT IN AN ORGANIZED HEALTH CARE EDUCATION/TRAINING PROGRAM

## 2023-10-17 PROCEDURE — 88305 TISSUE EXAM BY PATHOLOGIST: CPT | Performed by: PATHOLOGY

## 2023-10-17 RX ORDER — PROPOFOL 10 MG/ML
INJECTION, EMULSION INTRAVENOUS AS NEEDED
Status: DISCONTINUED | OUTPATIENT
Start: 2023-10-17 | End: 2023-10-17

## 2023-10-17 RX ORDER — SODIUM CHLORIDE, SODIUM LACTATE, POTASSIUM CHLORIDE, CALCIUM CHLORIDE 600; 310; 30; 20 MG/100ML; MG/100ML; MG/100ML; MG/100ML
INJECTION, SOLUTION INTRAVENOUS CONTINUOUS PRN
Status: DISCONTINUED | OUTPATIENT
Start: 2023-10-17 | End: 2023-10-17

## 2023-10-17 RX ORDER — LIDOCAINE HYDROCHLORIDE 20 MG/ML
INJECTION, SOLUTION EPIDURAL; INFILTRATION; INTRACAUDAL; PERINEURAL AS NEEDED
Status: DISCONTINUED | OUTPATIENT
Start: 2023-10-17 | End: 2023-10-17

## 2023-10-17 RX ORDER — SODIUM CHLORIDE, SODIUM LACTATE, POTASSIUM CHLORIDE, CALCIUM CHLORIDE 600; 310; 30; 20 MG/100ML; MG/100ML; MG/100ML; MG/100ML
125 INJECTION, SOLUTION INTRAVENOUS CONTINUOUS
Status: DISCONTINUED | OUTPATIENT
Start: 2023-10-17 | End: 2023-10-21 | Stop reason: HOSPADM

## 2023-10-17 RX ADMIN — PROPOFOL 100 MG: 10 INJECTION, EMULSION INTRAVENOUS at 07:25

## 2023-10-17 RX ADMIN — LIDOCAINE HYDROCHLORIDE 100 MG: 20 INJECTION, SOLUTION EPIDURAL; INFILTRATION; INTRACAUDAL at 07:18

## 2023-10-17 RX ADMIN — SODIUM CHLORIDE, SODIUM LACTATE, POTASSIUM CHLORIDE, AND CALCIUM CHLORIDE: .6; .31; .03; .02 INJECTION, SOLUTION INTRAVENOUS at 07:15

## 2023-10-17 RX ADMIN — PROPOFOL 100 MG: 10 INJECTION, EMULSION INTRAVENOUS at 07:22

## 2023-10-17 RX ADMIN — PROPOFOL 50 MG: 10 INJECTION, EMULSION INTRAVENOUS at 07:30

## 2023-10-17 RX ADMIN — PROPOFOL 50 MG: 10 INJECTION, EMULSION INTRAVENOUS at 07:36

## 2023-10-17 RX ADMIN — PROPOFOL 200 MG: 10 INJECTION, EMULSION INTRAVENOUS at 07:18

## 2023-10-17 NOTE — ANESTHESIA POSTPROCEDURE EVALUATION
Post-Op Assessment Note    CV Status:  Stable  Pain Score: 0    Pain management: adequate     Mental Status:  Alert and awake   Hydration Status:  Euvolemic   PONV Controlled:  Controlled   Airway Patency:  Patent and adequate   Two or more mitigation strategies used for obstructive sleep apnea   Post Op Vitals Reviewed: Yes      Staff: CRNA         No notable events documented.     BP   100/68   Temp     Pulse  78   Resp   20   SpO2   99

## 2023-10-17 NOTE — ANESTHESIA PREPROCEDURE EVALUATION
Procedure:  COLONOSCOPY  EGD    Relevant Problems   ANESTHESIA (within normal limits)      CARDIO (within normal limits)      ENDO (within normal limits)      GI/HEPATIC  Confirmed NPo appropriate  S/p bowel prep   (+) GERD (gastroesophageal reflux disease)      /RENAL (within normal limits)      GYN  Urinary beta hcg neg in preop holding   (-) Currently pregnant      HEMATOLOGY (within normal limits)      NEURO/PSYCH   (+) Anxiety      PULMONARY (within normal limits)   (-) Smoking   (-) URI (upper respiratory infection)        Physical Exam    Airway  Comment: Large tonsils  Mallampati score: I  TM Distance: >3 FB  Neck ROM: full     Dental   No notable dental hx     Cardiovascular  Cardiovascular exam normal    Pulmonary  Pulmonary exam normal     Other Findings        Anesthesia Plan  ASA Score- 2     Anesthesia Type- IV sedation with anesthesia with ASA Monitors. Additional Monitors:     Airway Plan:     Comment: I discussed the risks and benefits of IV sedation anesthesia including the possibility of the need to convert to general anesthesia and the potential risk of awareness. The patient was given the opportunity to ask questions, which were answered. .       Plan Factors-Exercise tolerance (METS): >4 METS. Chart reviewed. Patient is not a current smoker. Induction- intravenous. Postoperative Plan-     Informed Consent- Anesthetic plan and risks discussed with patient. I personally reviewed this patient with the CRNA. Discussed and agreed on the Anesthesia Plan with the CRNA. Darylene Pipe

## 2023-10-17 NOTE — H&P
History and Physical -  [facility]  Mónica KILGORE Magno Jony 26 y.o. female MRN: [id number]      HPI: Ken Bowie is a 26y.o. year old female who presents for evaluation of abdominal pain, nausea, constipation      REVIEW OF SYSTEMS: Per the HPI, and otherwise unremarkable. Historical Information   Past Medical History:   Diagnosis Date    Allergic     Anxiety     GERD (gastroesophageal reflux disease)     Medical history reviewed with no changes      Past Surgical History:   Procedure Laterality Date    EYE SURGERY      lazy eye     Social History   Social History     Substance and Sexual Activity   Alcohol Use No     Social History     Substance and Sexual Activity   Drug Use Never     Social History     Tobacco Use   Smoking Status Never   Smokeless Tobacco Never     Family History   Problem Relation Age of Onset    Glaucoma Mother     Hyperlipidemia Mother         hypercholesterolemia    Depression Mother     Anxiety disorder Mother     ETHAN disease Father     Hyperlipidemia Father         hypercholesterolemia       Meds/Allergies     (Not in a hospital admission)      Allergies   Allergen Reactions    Allyl Isothiocyanate Hives     (Dominic Mustard)    Milk (Cow) Diarrhea     Mom states hyper sensitive to milk products    Nexium [Esomeprazole] Hives       Objective     Blood pressure 114/61, pulse 80, temperature (!) 97.2 °F (36.2 °C), temperature source Temporal, resp. rate 16, height 5' 9" (1.753 m), weight 87.1 kg (192 lb 0.3 oz), last menstrual period 09/11/2023, SpO2 97 %. PHYSICAL EXAM    Gen: NAD  CV: RRR  CHEST: Clear  ABD: soft, NT/ND  EXT: no edema      ASSESSMENT/PLAN:  This is a 26y.o. year old female here for EGD, colonoscopy, and she is stable and optimized for her procedure.

## 2023-10-20 PROCEDURE — 88305 TISSUE EXAM BY PATHOLOGIST: CPT | Performed by: PATHOLOGY

## 2023-10-30 DIAGNOSIS — F41.1 GENERALIZED ANXIETY DISORDER WITH PANIC ATTACKS: ICD-10-CM

## 2023-10-30 DIAGNOSIS — F41.0 GENERALIZED ANXIETY DISORDER WITH PANIC ATTACKS: ICD-10-CM

## 2023-10-30 RX ORDER — ESCITALOPRAM OXALATE 10 MG/1
10 TABLET ORAL DAILY
Qty: 60 TABLET | Refills: 1 | Status: SHIPPED | OUTPATIENT
Start: 2023-10-30

## 2023-11-09 ENCOUNTER — TELEMEDICINE (OUTPATIENT)
Dept: PSYCHIATRY | Facility: CLINIC | Age: 23
End: 2023-11-09
Payer: COMMERCIAL

## 2023-11-09 DIAGNOSIS — F41.1 GENERALIZED ANXIETY DISORDER WITH PANIC ATTACKS: Primary | ICD-10-CM

## 2023-11-09 DIAGNOSIS — F33.8 SEASONAL DEPRESSION (HCC): ICD-10-CM

## 2023-11-09 DIAGNOSIS — F41.0 GENERALIZED ANXIETY DISORDER WITH PANIC ATTACKS: Primary | ICD-10-CM

## 2023-11-09 PROBLEM — E28.2 PCOS (POLYCYSTIC OVARIAN SYNDROME): Status: ACTIVE | Noted: 2023-06-14

## 2023-11-09 PROCEDURE — 99214 OFFICE O/P EST MOD 30 MIN: CPT

## 2023-11-09 RX ORDER — ESCITALOPRAM OXALATE 20 MG/1
20 TABLET ORAL DAILY
Qty: 90 TABLET | Refills: 0 | Status: SHIPPED | OUTPATIENT
Start: 2023-11-09

## 2023-11-09 NOTE — PSYCH
Virtual Regular Visit    Verification of patient location:    Patient is located in the following state in which I hold an active license [place]    Problem List Items Addressed This Visit    None  Visit Diagnoses       Generalized anxiety disorder with panic attacks    -  Primary    Relevant Medications    escitalopram (LEXAPRO) 20 mg tablet    Seasonal depression (HCC)        Relevant Medications    escitalopram (LEXAPRO) 20 mg tablet               Encounter provider MIRANDA Figueredo    Provider located at    [id number] [facility] [address] [phone]    Recent Visits  Date Type Provider Dept   11/09/23 Telemedicine Georgie Turner, [address] [place] [address]   Showing recent visits within past 7 days and meeting all other requirements  Future Appointments  No visits were found meeting these conditions. Showing future appointments within next 150 days and meeting all other requirements         The patient was identified by name and date of birth. Bakari Chaudhary was informed that this is a telemedicine visit and [facility] She agrees to proceed. .  My office door was closed. No one else was in the room. She acknowledged consent and understanding of privacy and security of the video platform. The patient has agreed to participate and understands they can discontinue the visit at any time. Patient is aware this is a billable service.      HPI     Current Outpatient Medications   Medication Sig Dispense Refill    escitalopram (LEXAPRO) 20 mg tablet Take 1 tablet (20 mg total) by mouth daily 90 tablet 0    famotidine (PEPCID) 40 MG tablet Take 1 tablet (40 mg total) by mouth daily 90 tablet 1    dicyclomine (BENTYL) 20 mg tablet Take 1 tablet (20 mg total) by mouth every 6 (six) hours for 20 doses 20 tablet 0    medroxyPROGESTERone (PROVERA) 10 mg tablet  (Patient not taking: Reported on 11/9/2023)      ondansetron (ZOFRAN-ODT) 4 mg disintegrating tablet Take 1 tablet (4 mg total) by mouth every 8 (eight) hours as needed for nausea or vomiting 90 tablet 0     No current facility-administered medications for this visit. Review of Systems  Video Exam    There were no vitals filed for this visit. Physical Exam   As a result of this visit, I have referred the patient for further respiratory evaluation. No      VIRTUAL VISIT DISCLAIMER    Mónica Hurst acknowledges that she has consented to an online visit or consultation. She understands that the online visit is based solely on information provided by her, and that, in the absence of a face-to-face physical evaluation by the physician, the diagnosis she receives is both limited and provisional in terms of accuracy and completeness. This is not intended to replace a full medical face-to-face evaluation by the physician. Mónica MAGUI Godoy understands and accepts these terms. MEDICATION MANAGEMENT NOTE        [facility]    Name and Date of Birth:  Leatha Martinez 26 y.o. 2000 MRN: [id number]    Date of Visit: November 10, 2023    Allergies   Allergen Reactions    Allyl Isothiocyanate Hives     (Dominic Mustard)    Milk (Cow) Diarrhea     Mom states hyper sensitive to milk products    Nexium [Esomeprazole] Hives     SUBJECTIVE:    Mónica is seen today for a follow up for depression and Generalized Anxiety Disorder. She reports that she continues to experience on and off depressive symptoms since the last visit. Patient reports new onset of seasonal affect symptoms including low energy, low motivation, mild dysphoria since day light saving  times occurred-states this happens every year for her, does not quite remember it being this impactful on her life.   States she had no motivation to get out of bed and missed college classes on monday, this was the only day this occurred, has been able to function remainder of week. She is agreeable to increase Lexapro to 20 mg daily for symptoms she is tolerating with no adverse effects. Call office if symptoms do not improve before next visit. Anxiety has been relatively controlled since she restarted Lexapro during last visit, no reported panic. Continues to be very busy finishing college semester, looking for a full-time job in her field. Mood is pleasant, euthymic during encounter. Denies SI. She denies any side effects from medications. PLAN:    -Titrate Lexapro to 20 mg daily for seasonal depression, chronic anxiety regarding health-IBS, GERD  PARQ completed including serotonin syndrome, SIADH, worsening depression, suicidality, induction of aniceto, GI upset, headaches, activation, sexual side effects, sedation, potential drug interactions, and others. Aware of 24 hour and weekend coverage for urgent situations accessed by calling [facility] main practice number  Continue psychotherapy with therapist    Diagnoses and all orders for this visit:    Generalized anxiety disorder with panic attacks  -     escitalopram (LEXAPRO) 20 mg tablet; Take 1 tablet (20 mg total) by mouth daily    Seasonal depression (HCC)        Current Outpatient Medications on File Prior to Visit   Medication Sig Dispense Refill    famotidine (PEPCID) 40 MG tablet Take 1 tablet (40 mg total) by mouth daily 90 tablet 1    dicyclomine (BENTYL) 20 mg tablet Take 1 tablet (20 mg total) by mouth every 6 (six) hours for 20 doses 20 tablet 0    medroxyPROGESTERone (PROVERA) 10 mg tablet  (Patient not taking: Reported on 11/9/2023)      ondansetron (ZOFRAN-ODT) 4 mg disintegrating tablet Take 1 tablet (4 mg total) by mouth every 8 (eight) hours as needed for nausea or vomiting 90 tablet 0     No current facility-administered medications on file prior to visit. Psychotherapy Provided:       Supportive counseling provided.   Medication changes discussed with Mónica. Medication education provided to Mónica regarding lexapro     HPI ROS Appetite Changes and Sleep:     She reports normal sleep, adequate appetite, adequate energy level.  Denies homicidal ideation, denies suicidal ideation    Review Of Systems:      HPI ROS:               Medication Side Effects:  denies    Depression (10 worst): 4/10    Anxiety (10 worst): 4/10    Safety concerns (SI, HI, etc): Denies si and hi    Sleep: 7 hrs    Energy: fair    Appetite: 2-3 meals    Weight Change: denies        Mental Status Evaluation:    Appearance Adequate hygiene and grooming   Behavior calm and cooperative   Mood euthymic  Depression Scale - 4 of 10 (0 = No depression)  Anxiety Scale - 4 of 10 (0 = No anxiety)   Speech Normal rate and volume   Affect appropriate   Thought Processes Goal directed and coherent   Thought Content Does not verbalize delusional material   Associations Tightly connected   Perceptual Disturbances Denies hallucinations and does not appear to be responding to internal stimuli   Risk Potential Suicidal/Homicidal Ideation - No evidence of suicidal or homicidal ideation and patient does not verbalize suicidal or homicidal ideation  Risk of Violence - No evidence of risk for violence found on assessment  Risk of Self Mutilation - No evidence of risk for self mutilation found on assessment   Orientation oriented to person, place, time/date and situation   Memory recent and remote memory grossly intact   Consciousness alert and awake   Attention/Concentration attention span and concentration appear shorter than expected for age   Insight intact   Judgement intact   Muscle Strength and Gait normal muscle strength and normal muscle tone, normal gait/station and normal balance   Motor Activity no abnormal movements   Language no difficulty naming common objects, no difficulty repeating a phrase, no difficulty writing a sentence   Fund of Knowledge adequate knowledge of current events  adequate fund of knowledge regarding past history  adequate fund of knowledge regarding vocabulary          Past Medical History:    Past Medical History:   Diagnosis Date    Allergic     Anxiety     GERD (gastroesophageal reflux disease)     Medical history reviewed with no changes     PCOS (polycystic ovarian syndrome)         Past Surgical History:   Procedure Laterality Date    EYE SURGERY      lazy eye     Allergies   Allergen Reactions    Allyl Isothiocyanate Hives     (Dominic Mustard)    Milk (Cow) Diarrhea     Mom states hyper sensitive to milk products    Nexium [Esomeprazole] Hives     Substance Abuse History:    Social History     Substance and Sexual Activity   Alcohol Use No     Social History     Substance and Sexual Activity   Drug Use Never     Social History:    Social History     Socioeconomic History    Marital status: Single     Spouse name: Not on file    Number of children: Not on file    Years of education: Not on file    Highest education level: Not on file   Occupational History    Not on file   Tobacco Use    Smoking status: Never    Smokeless tobacco: Never   Substance and Sexual Activity    Alcohol use: No    Drug use: Never    Sexual activity: Not Currently     Partners: Male     Birth control/protection: Condom Male   Other Topics Concern    Not on file   Social History Narrative    Denied:  Caffeine use     Social Determinants of Health     Financial Resource Strain: Not on file   Food Insecurity: Not on file   Transportation Needs: Not on file   Physical Activity: Not on file   Stress: Not on file   Social Connections: Not on file   Intimate Partner Violence: Not on file   Housing Stability: Not on file     Family Psychiatric History:     Family History   Problem Relation Age of Onset    Glaucoma Mother     Hyperlipidemia Mother         hypercholesterolemia    Depression Mother     Anxiety disorder Mother     ETHAN disease Father     Hyperlipidemia Father         hypercholesterolemia     History Review: The following portions of the patient's history were reviewed and updated as appropriate: allergies, current medications, past family history, past medical history, past social history, past surgical history and problem list     OBJECTIVE:     Vital signs in last 24 hours: There were no vitals filed for this visit. Laboratory Results:   Recent Labs (last 2 months):   Hospital Outpatient Visit on 10/17/2023   Component Date Value    EXT Preg Test, Ur 10/17/2023 Negative     Control 10/17/2023 Valid     Case Report 10/17/2023                      Value:Surgical Pathology Report                         Case: [id number]                                   Authorizing Provider:  Selena Levy DO          Collected:           10/17/2023 0842              Ordering Location:      [facility]       Received:            10/17/2023 [address] [facility]                                                             Pathologist:           Ponce Severe, MD                                                                 Specimens:   A) - Small Bowel, NOS                                                                               B) - Stomach                                                                               Final Diagnosis 10/17/2023                      Value: This result contains rich text formatting which cannot be displayed here. Additional Information 10/17/2023                      Value: This result contains rich text formatting which cannot be displayed here. Gross Description 10/17/2023                      Value: This result contains rich text formatting which cannot be displayed here. Clinical Information 10/17/2023                      Value:Cold bx r/o celiac     I have personally reviewed all pertinent laboratory/tests results.     Suicide/Homicide Risk Assessment:    Risk of Harm to Self:  Protective Factors: no current suicidal ideation, access to mental health treatment, compliant with medications, compliant with mental health treatment, connection to community, having a desire to be alive, having a desire to live, having a sense of purpose or meaning in life, healthy fear of risky behaviors and pain, impulse control  Based on today's assessment, Mónica presents the following risk of harm to self: minimal    Risk of Harm to Others:  Based on today's assessment, Mónica presents the following risk of harm to others: none    The following interventions are recommended: referral for psychotherapy    Medications Risks/Benefits:      Risks, Benefits And Possible Side Effects Of Medications:    Discussed risks and benefits of treatment with patient including risk of suicidality, serotonin syndrome, increased QTc interval and SIADH related to treatment with antidepressants; Risk of induction of manic symptoms in certain patient populations     Controlled Medication Discussion:     Not applicable    Treatment Plan:    Due for update/Updated:   yes  Last treatment plan done 8/4/23 by nata castro. Treatment Plan due on 2/4/24. MIRANDA Mckeon 11/10/23    This note was shared with patient.    Visit Time    Visit Start Time: 2  Visit Stop Time: 2:16  Total Visit Duration:  16 minutes

## 2023-12-30 ENCOUNTER — AMB VIDEO VISIT (OUTPATIENT)
Dept: OTHER | Facility: HOSPITAL | Age: 23
End: 2023-12-30

## 2023-12-30 VITALS — HEART RATE: 88 BPM | OXYGEN SATURATION: 98 % | TEMPERATURE: 98.7 F

## 2023-12-30 DIAGNOSIS — J01.00 ACUTE NON-RECURRENT MAXILLARY SINUSITIS: Primary | ICD-10-CM

## 2023-12-30 PROCEDURE — ECARE PR SL URGENT CARE VIRTUAL VISIT: Performed by: PHYSICIAN ASSISTANT

## 2023-12-30 RX ORDER — AMOXICILLIN AND CLAVULANATE POTASSIUM 875; 125 MG/1; MG/1
1 TABLET, FILM COATED ORAL EVERY 12 HOURS SCHEDULED
Qty: 14 TABLET | Refills: 0 | Status: SHIPPED | OUTPATIENT
Start: 2023-12-30 | End: 2024-01-06

## 2023-12-30 NOTE — CARE ANYWHERE EVISITS
Visit Summary for JEANINE CAMILO - Gender: Female - Date of Birth: 2000  Date: [id number] - Duration: 12 minutes  Patient: JEANINE CAMILO  Provider: Jignesh Drake PA-C    Patient Contact Information  Address  [place]; [place] [address]  [phone]    Visit Topics  Headache [Added By: Self - 2023-12-30]  Earache [Added By: Self - 2023-12-30]  Cold [Added By: Self - 2023-12-30]    Triage Questions   What is your current physical address in the event of a medical emergency? Answer []  Are you allergic to any medications? Answer []  Are you now or could you be pregnant? Answer []  Do you have any immune system compromise or chronic lung   disease? Answer []  Do you have any vulnerable family members in the home (infant, pregnant, cancer, elderly)? Answer []     Conversation Transcripts  [0A][0A] [Notification] You are connected with Jignesh Drake PA-C, Urgent Care Specialist.[0A][Notification] JEANINE CAMILO is located in [place].[0A][Notification] JEANINE CAMILO has shared health history...[0A]    Diagnosis  Acute sinusitis, unspecified    Procedures  Value: [id number] Code: CPT-4 UNLISTED E&M SERVICE    Medications Prescribed    No prescriptions ordered    Electronically signed by: Jignesh Drake PA-C(NPI [id number])

## 2023-12-30 NOTE — PROGRESS NOTES
Required Documentation:  Encounter provider Jignesh Drake PA-C    Provider located at [facility]  VIRTUAL CARE   [address] [facility] [address]    Identify all parties in room with patient during virtual visit:  No one else    The patient was identified by name and date of birth. Mónica Godoy was informed that this is a telemedicine visit and that the visit is being conducted through the [employer] platform. She agrees to proceed..  My office door was closed. No one else was in the room.  She acknowledged consent and understanding of privacy and security of the video platform. The patient has agreed to participate and understands they can discontinue the visit at any time.    Verification of patient location:    Patient is located at home in the following state in which I hold an active license [place]    Patient is aware this is a billable service.     Reason for visit is No chief complaint on file.       Subjective  HPI   6 days of sinus pressure and congestion, has cough that started last night and became productive. Yellow mucus. Chest soreness from coughing, no SOB or wheezing, sore throat, no fever or chills. No covid or flu test. Has been vaccinated for covid, not flu yet. Mild nausea no vomiting or diarrhea. Taking mucinex cold and flu with relief.    Past Medical History:   Diagnosis Date    Allergic     Anxiety     GERD (gastroesophageal reflux disease)     Medical history reviewed with no changes     PCOS (polycystic ovarian syndrome)        Past Surgical History:   Procedure Laterality Date    EYE SURGERY      lazy eye        Allergies   Allergen Reactions    Allyl Isothiocyanate Hives     (Dominic Mustard)    Milk (Cow) Diarrhea     Mom states hyper sensitive to milk products    Nexium [Esomeprazole] Hives       Review of Systems   Constitutional:  Negative for chills and fever.   HENT:  Positive for congestion and sinus pressure. Negative for  ear pain, hearing loss, nosebleeds, sinus pain, sore throat, trouble swallowing and voice change.    Eyes:  Negative for redness and visual disturbance.   Respiratory:  Positive for cough. Negative for shortness of breath and wheezing.    Cardiovascular:  Negative for chest pain.   Gastrointestinal:  Positive for nausea. Negative for abdominal pain and vomiting.   Neurological:  Positive for headaches.   Psychiatric/Behavioral:  Negative for behavioral problems.        Video Exam    Vitals:    12/30/23 1025   Pulse: 88   Temp: 98.7 °F (37.1 °C)   SpO2: 98%       Physical Exam  Constitutional:       General: She is not in acute distress.     Appearance: Normal appearance. She is not toxic-appearing.   HENT:      Head: Normocephalic and atraumatic.      Nose: No rhinorrhea.      Mouth/Throat:      Comments: TTP on directed exam over maxillary sinuses  Eyes:      Conjunctiva/sclera: Conjunctivae normal.   Cardiovascular:      Rate and Rhythm: Normal rate.   Pulmonary:      Effort: No respiratory distress.   Musculoskeletal:      Cervical back: Neck supple.   Skin:     Findings: No rash.   Neurological:      Mental Status: She is alert and oriented to person, place, and time.      Cranial Nerves: No dysarthria or facial asymmetry.   Psychiatric:         Mood and Affect: Mood normal.         Behavior: Behavior normal.         TTP L max sinus, L ear post auricular LAD.     Visit Time  Total Visit Duration: 12 minutes    Assessment/Plan:        Diagnoses and all orders for this visit:    Acute non-recurrent maxillary sinusitis  -     amoxicillin-clavulanate (AUGMENTIN) 875-125 mg per tablet; Take 1 tablet by mouth every 12 (twelve) hours for 7 days      23-year-old female with likely bacterial maxillary sinusitis based upon length of symptoms her pain as well as her positive response to decongestions.  Will treat her for 7 days with Augmentin.  Advised her to follow-up with her PCP or seek care otherwise if she has  worsening symptoms or no improvement over the next 7 days.    There are no Patient Instructions on file for this visit.

## 2024-01-18 ENCOUNTER — TELEMEDICINE (OUTPATIENT)
Dept: PSYCHIATRY | Facility: CLINIC | Age: 24
End: 2024-01-18

## 2024-01-18 DIAGNOSIS — F33.8 SEASONAL DEPRESSION (HCC): ICD-10-CM

## 2024-01-18 DIAGNOSIS — F41.1 GENERALIZED ANXIETY DISORDER WITH PANIC ATTACKS: Primary | ICD-10-CM

## 2024-01-18 DIAGNOSIS — F41.0 GENERALIZED ANXIETY DISORDER WITH PANIC ATTACKS: Primary | ICD-10-CM

## 2024-01-18 RX ORDER — ESCITALOPRAM OXALATE 20 MG/1
20 TABLET ORAL DAILY
Qty: 90 TABLET | Refills: 0 | Status: SHIPPED | OUTPATIENT
Start: 2024-01-18

## 2024-01-18 NOTE — PSYCH
Virtual Regular Visit    Verification of patient location:    Patient is located in the following state in which I hold an active license [place]    Problem List Items Addressed This Visit    None  Visit Diagnoses       Generalized anxiety disorder with panic attacks    -  Primary    Relevant Medications    escitalopram (LEXAPRO) 20 mg tablet    Seasonal depression (HCC)        Relevant Medications    escitalopram (LEXAPRO) 20 mg tablet               Encounter provider MIRANDA Estrada    Provider located at    [facility]  [address] [facility] [address] [phone]    Recent Visits  No visits were found meeting these conditions.  Showing recent visits within past 7 days and meeting all other requirements  Today's Visits  Date Type Provider Dept   01/18/24 Telemedicine MIRANDA Estrada [facility]   Showing today's visits and meeting all other requirements  Future Appointments  No visits were found meeting these conditions.  Showing future appointments within next 150 days and meeting all other requirements         The patient was identified by name and date of birth. Mónica Godoy was informed that this is a telemedicine visit and that the visit is being conducted throughthe Epic Embedded platform. She agrees to proceed..  My office door was closed. No one else was in the room.  She acknowledged consent and understanding of privacy and security of the video platform. The patient has agreed to participate and understands they can discontinue the visit at any time.    Patient is aware this is a billable service.     HPI     Current Outpatient Medications   Medication Sig Dispense Refill    escitalopram (LEXAPRO) 20 mg tablet Take 1 tablet (20 mg total) by mouth daily 90 tablet 0    famotidine (PEPCID) 40 MG tablet Take 1 tablet (40 mg total) by mouth daily 90 tablet 1    dicyclomine (BENTYL) 20 mg tablet Take 1 tablet (20  mg total) by mouth every 6 (six) hours for 20 doses 20 tablet 0    medroxyPROGESTERone (PROVERA) 10 mg tablet  (Patient not taking: Reported on 11/9/2023)      ondansetron (ZOFRAN-ODT) 4 mg disintegrating tablet Take 1 tablet (4 mg total) by mouth every 8 (eight) hours as needed for nausea or vomiting 90 tablet 0     No current facility-administered medications for this visit.       Review of Systems  Video Exam    There were no vitals filed for this visit.    Physical Exam   As a result of this visit, I have referred the patient for further respiratory evaluation. No      VIRTUAL VISIT DISCLAIMER    Mónica Godoy acknowledges that she has consented to an online visit or consultation. She understands that the online visit is based solely on information provided by her, and that, in the absence of a face-to-face physical evaluation by the physician, the diagnosis she receives is both limited and provisional in terms of accuracy and completeness. This is not intended to replace a full medical face-to-face evaluation by the physician. Mónica Godoy understands and accepts these terms.    MEDICATION MANAGEMENT NOTE        [facility]    Name and Date of Birth:  Mónica Godoy 23 y.o. 2000 MRN: [id number]    Date of Visit: January 18, 2024    Allergies   Allergen Reactions    Allyl Isothiocyanate Hives     (Dominic Mustard)    Milk (Cow) Diarrhea     Mom states hyper sensitive to milk products    Nexium [Esomeprazole] Hives    Pineapple - Food Allergy Hives, Itching and Tongue Swelling     SUBJECTIVE:    Mónica is seen today for a follow up for depression and Generalized Anxiety Disorder. She reports that she continues to do very well since the last visit.  Patient is reporting improvement in prior seasonal depressive s/s since titration of Lexapro to 20mg last visit.  Describes recent depression is 0/10 with no concerning symptoms over the past 2 months and is overall very happy  with her progress.  Describes anxiety 2/10 related to starting a new job at [employer], appears to be handling adequately with no significant or disruptive anxiety.  Denies any periods of panic attacks.  Happy to report she graduated college and is completely finished with school which has improved her mood.  Mood is bright, euthymic, pleasant.  No significant concerns or stressors at this time.  Continues therapy for extra support.  Denies SI.      She denies any side effects from medications.    PLAN:    -Continue Lexapro to 20 mg daily for hx depression and anxiety  PARQ completed including serotonin syndrome, SIADH, worsening depression, suicidality, induction of aniceto, GI upset, headaches, activation, sexual side effects, sedation, potential drug interactions, and others.      Aware of 24 hour and weekend coverage for urgent situations accessed by calling [facility] main practice number  Continue psychotherapy with therapist    Diagnoses and all orders for this visit:    Generalized anxiety disorder with panic attacks  -     escitalopram (LEXAPRO) 20 mg tablet; Take 1 tablet (20 mg total) by mouth daily    Seasonal depression (HCC)        Current Outpatient Medications on File Prior to Visit   Medication Sig Dispense Refill    famotidine (PEPCID) 40 MG tablet Take 1 tablet (40 mg total) by mouth daily 90 tablet 1    [DISCONTINUED] escitalopram (LEXAPRO) 20 mg tablet Take 1 tablet (20 mg total) by mouth daily 90 tablet 0    dicyclomine (BENTYL) 20 mg tablet Take 1 tablet (20 mg total) by mouth every 6 (six) hours for 20 doses 20 tablet 0    medroxyPROGESTERone (PROVERA) 10 mg tablet  (Patient not taking: Reported on 11/9/2023)      ondansetron (ZOFRAN-ODT) 4 mg disintegrating tablet Take 1 tablet (4 mg total) by mouth every 8 (eight) hours as needed for nausea or vomiting 90 tablet 0     No current facility-administered medications on file prior to visit.       Psychotherapy Provided:        Supportive counseling provided.  Medication changes discussed with Mónica.  Medication education provided to Mónica regarding lexapro     HPI ROS Appetite Changes and Sleep:     She reports normal sleep, adequate appetite, adequate energy level. Denies homicidal ideation, denies suicidal ideation    Review Of Systems:      HPI ROS:               Medication Side Effects:  denies    Depression (10 worst): 0/10    Anxiety (10 worst): 3/10    Safety concerns (SI, HI, etc): Denies si and hi    Sleep: 7 hrs    Energy: fair    Appetite: 2-3 meals    Weight Change: denies        Mental Status Evaluation:    Appearance Adequate hygiene and grooming   Behavior calm and cooperative   Mood euthymic  Depression Scale - 0 of 10 (0 = No depression)  Anxiety Scale - 3 of 10 (0 = No anxiety)   Speech Normal rate and volume   Affect appropriate   Thought Processes Goal directed and coherent   Thought Content Does not verbalize delusional material   Associations Tightly connected   Perceptual Disturbances Denies hallucinations and does not appear to be responding to internal stimuli   Risk Potential Suicidal/Homicidal Ideation - No evidence of suicidal or homicidal ideation and patient does not verbalize suicidal or homicidal ideation  Risk of Violence - No evidence of risk for violence found on assessment  Risk of Self Mutilation - No evidence of risk for self mutilation found on assessment   Orientation oriented to person, place, time/date and situation   Memory recent and remote memory grossly intact   Consciousness alert and awake   Attention/Concentration attention span and concentration appear shorter than expected for age   Insight intact   Judgement intact   Muscle Strength and Gait normal muscle strength and normal muscle tone, normal gait/station and normal balance   Motor Activity no abnormal movements   Language no difficulty naming common objects, no difficulty repeating a phrase, no difficulty writing a sentence   Fund  of Knowledge adequate knowledge of current events  adequate fund of knowledge regarding past history  adequate fund of knowledge regarding vocabulary          Past Medical History:    Past Medical History:   Diagnosis Date    Allergic     Anxiety     GERD (gastroesophageal reflux disease)     Medical history reviewed with no changes     PCOS (polycystic ovarian syndrome)         Past Surgical History:   Procedure Laterality Date    EYE SURGERY      lazy eye     Allergies   Allergen Reactions    Allyl Isothiocyanate Hives     (Dominic Mustard)    Milk (Cow) Diarrhea     Mom states hyper sensitive to milk products    Nexium [Esomeprazole] Hives    Pineapple - Food Allergy Hives, Itching and Tongue Swelling     Substance Abuse History:    Social History     Substance and Sexual Activity   Alcohol Use No     Social History     Substance and Sexual Activity   Drug Use Never     Social History:    Social History     Socioeconomic History    Marital status: Single     Spouse name: Not on file    Number of children: Not on file    Years of education: Not on file    Highest education level: Not on file   Occupational History    Not on file   Tobacco Use    Smoking status: Never    Smokeless tobacco: Never   Substance and Sexual Activity    Alcohol use: No    Drug use: Never    Sexual activity: Not Currently     Partners: Male     Birth control/protection: Condom Male   Other Topics Concern    Not on file   Social History Narrative    Denied:  Caffeine use     Social Determinants of Health     Financial Resource Strain: Not on file   Food Insecurity: Not on file   Transportation Needs: Not on file   Physical Activity: Not on file   Stress: Not on file   Social Connections: Not on file   Intimate Partner Violence: Not on file   Housing Stability: Not on file     Family Psychiatric History:     Family History   Problem Relation Age of Onset    Glaucoma Mother     Hyperlipidemia Mother         hypercholesterolemia    Depression  Mother     Anxiety disorder Mother     ETHAN disease Father     Hyperlipidemia Father         hypercholesterolemia     History Review:The following portions of the patient's history were reviewed and updated as appropriate: allergies, current medications, past family history, past medical history, past social history, past surgical history and problem list     OBJECTIVE:     Vital signs in last 24 hours:    There were no vitals filed for this visit.  Laboratory Results:   Recent Labs (last 2 months):   No visits with results within 2 Month(s) from this visit.   Latest known visit with results is:   Hospital Outpatient Visit on 10/17/2023   Component Date Value    EXT Preg Test, Ur 10/17/2023 Negative     Control 10/17/2023 Valid     Case Report 10/17/2023                      Value:Surgical Pathology Report                         Case: [id number]                                   Authorizing Provider:  Aravind Garibay DO          Collected:           10/17/2023 0727              Ordering Location:      [facility]       Received:            10/17/2023 [address] [place] [address] Endoscopy                                                             Pathologist:           Carlos Ardon MD                                                                 Specimens:   A) - Small Bowel, NOS                                                                               B) - Stomach                                                                               Final Diagnosis 10/17/2023                      Value:This result contains rich text formatting which cannot be displayed here.    Additional Information 10/17/2023                      Value:This result contains rich text formatting which cannot be displayed here.    Gross Description 10/17/2023                      Value:This result contains rich  text formatting which cannot be displayed here.    Clinical Information 10/17/2023                      Value:Cold bx r/o celiac     I have personally reviewed all pertinent laboratory/tests results.    Suicide/Homicide Risk Assessment:    Risk of Harm to Self:  Protective Factors: no current suicidal ideation, access to mental health treatment, compliant with medications, compliant with mental health treatment, connection to community, having a desire to be alive, having a desire to live, having a sense of purpose or meaning in life, healthy fear of risky behaviors and pain, impulse control  Based on today's assessment, Mónica presents the following risk of harm to self: minimal    Risk of Harm to Others:  Based on today's assessment, Mónica presents the following risk of harm to others: none    The following interventions are recommended: referral for psychotherapy    Medications Risks/Benefits:      Risks, Benefits And Possible Side Effects Of Medications:    Discussed risks and benefits of treatment with patient including risk of suicidality, serotonin syndrome, increased QTc interval and SIADH related to treatment with antidepressants; Risk of induction of manic symptoms in certain patient populations     Controlled Medication Discussion:     Not applicable    Treatment Plan:    Due for update/Updated:     Last treatment plan done 8/4/23 by nata castro.  Treatment Plan due on 2/4/24.    MIRANDA Estrada 01/18/24    This note was shared with patient.   Visit Time    Visit Start Time: 1  Visit Stop Time: 120  Total Visit Duration:  20 minutes

## 2024-02-28 DIAGNOSIS — F41.1 GENERALIZED ANXIETY DISORDER WITH PANIC ATTACKS: ICD-10-CM

## 2024-02-28 DIAGNOSIS — F41.0 GENERALIZED ANXIETY DISORDER WITH PANIC ATTACKS: ICD-10-CM

## 2024-02-28 RX ORDER — ESCITALOPRAM OXALATE 20 MG/1
20 TABLET ORAL DAILY
Qty: 90 TABLET | Refills: 0 | Status: SHIPPED | OUTPATIENT
Start: 2024-02-28

## 2024-04-25 ENCOUNTER — TELEMEDICINE (OUTPATIENT)
Dept: PSYCHIATRY | Facility: CLINIC | Age: 24
End: 2024-04-25
Payer: COMMERCIAL

## 2024-04-25 DIAGNOSIS — F41.0 GENERALIZED ANXIETY DISORDER WITH PANIC ATTACKS: Primary | ICD-10-CM

## 2024-04-25 DIAGNOSIS — F41.1 GENERALIZED ANXIETY DISORDER WITH PANIC ATTACKS: Primary | ICD-10-CM

## 2024-04-25 PROCEDURE — 99213 OFFICE O/P EST LOW 20 MIN: CPT

## 2024-04-25 RX ORDER — ESCITALOPRAM OXALATE 20 MG/1
20 TABLET ORAL DAILY
Qty: 90 TABLET | Refills: 0 | Status: SHIPPED | OUTPATIENT
Start: 2024-04-25

## 2024-04-25 NOTE — BH TREATMENT PLAN
TREATMENT PLAN (Medication Management Only)        [facility]    Name and Date of Birth:  Mónica Godoy 23 y.o. 2000  Date of Treatment Plan: April 25, 2024  Diagnosis/Diagnoses:    1. Generalized anxiety disorder with panic attacks      Strengths/Personal Resources for Self-Care: supportive family, supportive friends, taking medications as prescribed, sense of humor.  Area/Areas of need (in own words): anxiety symptoms  1. Long Term Goal: maintain control of acceptable anxiety level.  Target Date:6 months - 10/25/2024  Person/Persons responsible for completion of goal: Mónica  2.  Short Term Objective (s) - How will we reach this goal?:   A. Provider new recommended medication/dosage changes and/or continue medication(s): continue current medications as prescribed.  B. Take psychiatric medications responsibly.  C. Attend medication management appointments regularly.  Target Date:6 months - 10/25/2024  Person/Persons Responsible for Completion of Goal: Mónica  Progress Towards Goals: continuing treatment  Treatment Modality: medication management every 3 months  Review due 180 days from date of this plan: 6 months - 10/25/2024  Expected length of service: ongoing treatment  My Physician/PA/NP and I have developed this plan together and I agree to work on the goals and objectives. I understand the treatment goals that were developed for my treatment.

## 2024-04-25 NOTE — PSYCH
Virtual Regular Visit    Verification of patient location:    Patient is located in the following state in which I hold an active license [place]    Problem List Items Addressed This Visit    None  Visit Diagnoses       Generalized anxiety disorder with panic attacks    -  Primary               Encounter provider MIRANDA Estrada    Provider located at    [facility]  [address] [facility] [address] [phone]    Recent Visits  No visits were found meeting these conditions.  Showing recent visits within past 7 days and meeting all other requirements  Today's Visits  Date Type Provider Dept   04/25/24 Telemedicine MIRANDA Estrada  [facility]   Showing today's visits and meeting all other requirements  Future Appointments  No visits were found meeting these conditions.  Showing future appointments within next 150 days and meeting all other requirements         The patient was identified by name and date of birth. Mónica Godoy was informed that this is a telemedicine visit and that the visit is being conducted throughthe Epic Embedded platform. She agrees to proceed..  My office door was closed. No one else was in the room.  She acknowledged consent and understanding of privacy and security of the video platform. The patient has agreed to participate and understands they can discontinue the visit at any time.    Patient is aware this is a billable service.     HPI     Current Outpatient Medications   Medication Sig Dispense Refill    escitalopram (LEXAPRO) 20 mg tablet Take 1 tablet (20 mg total) by mouth daily 90 tablet 0    famotidine (PEPCID) 40 MG tablet Take 1 tablet (40 mg total) by mouth daily 90 tablet 1    dicyclomine (BENTYL) 20 mg tablet Take 1 tablet (20 mg total) by mouth every 6 (six) hours for 20 doses 20 tablet 0    medroxyPROGESTERone (PROVERA) 10 mg tablet  (Patient not taking: Reported on 11/9/2023)       ondansetron (ZOFRAN-ODT) 4 mg disintegrating tablet Take 1 tablet (4 mg total) by mouth every 8 (eight) hours as needed for nausea or vomiting 90 tablet 0     No current facility-administered medications for this visit.       Review of Systems  Video Exam    There were no vitals filed for this visit.    Physical Exam   As a result of this visit, I have referred the patient for further respiratory evaluation. No      VIRTUAL VISIT DISCLAIMER    Mónica Godoy acknowledges that she has consented to an online visit or consultation. She understands that the online visit is based solely on information provided by her, and that, in the absence of a face-to-face physical evaluation by the physician, the diagnosis she receives is both limited and provisional in terms of accuracy and completeness. This is not intended to replace a full medical face-to-face evaluation by the physician. Mónica Godoy understands and accepts these terms.    MEDICATION MANAGEMENT NOTE        [facility]    Name and Date of Birth:  Mónica Godoy 23 y.o. 2000 MRN: [id number]    Date of Visit: April 25, 2024    Allergies   Allergen Reactions    Allyl Isothiocyanate Hives     (Dominic Mustard)    Milk (Cow) Diarrhea     Mom states hyper sensitive to milk products    Nexium [Esomeprazole] Hives    Pineapple - Food Allergy Hives, Itching and Tongue Swelling     SUBJECTIVE:    Mónica is seen today for a follow up for depression and Generalized Anxiety Disorder. She reports that she continues to do very well since the last visit.  Patient reports complete stabilization with anxiety symptoms with Lexapro 20 mg daily.  Previously dealt with minor seasonal depressive symptoms which have completely remitted with the change in weather.  Describes her depression as 0/10 with no symptoms at this time.  She is happy with her progress made with current medication and no reason for change at this time.  Tolerating Lexapro  with no side effects.  Anxiety is very well controlled 2/10 with no concerning symptoms.  Managing work anxiety effectively which was main stressor in the past.  Mood is bright, pleasant, euthymic on today exam.  No further acute stressors at this time.  Denies SI.        She denies any side effects from medications.    PLAN:    -Continue Lexapro to 20 mg daily for hx depression and anxiety  PARQ completed including serotonin syndrome, SIADH, worsening depression, suicidality, induction of aniceto, GI upset, headaches, activation, sexual side effects, sedation, potential drug interactions, and others.      Aware of 24 hour and weekend coverage for urgent situations accessed by calling [facility] main practice number  Continue psychotherapy with therapist    Diagnoses and all orders for this visit:    Generalized anxiety disorder with panic attacks        Current Outpatient Medications on File Prior to Visit   Medication Sig Dispense Refill    escitalopram (LEXAPRO) 20 mg tablet Take 1 tablet (20 mg total) by mouth daily 90 tablet 0    famotidine (PEPCID) 40 MG tablet Take 1 tablet (40 mg total) by mouth daily 90 tablet 1    dicyclomine (BENTYL) 20 mg tablet Take 1 tablet (20 mg total) by mouth every 6 (six) hours for 20 doses 20 tablet 0    medroxyPROGESTERone (PROVERA) 10 mg tablet  (Patient not taking: Reported on 11/9/2023)      ondansetron (ZOFRAN-ODT) 4 mg disintegrating tablet Take 1 tablet (4 mg total) by mouth every 8 (eight) hours as needed for nausea or vomiting 90 tablet 0     No current facility-administered medications on file prior to visit.       Psychotherapy Provided:       Supportive counseling provided.  Medication changes discussed with Mónica.  Medication education provided to Mónica regarding lexapro     HPI ROS Appetite Changes and Sleep:     She reports normal sleep, adequate appetite, adequate energy level. Denies homicidal ideation, denies suicidal ideation    Review Of  Systems:      HPI ROS:               Medication Side Effects:  denies    Depression (10 worst): 0/10    Anxiety (10 worst): 2/10    Safety concerns (SI, HI, etc): Denies si and hi    Sleep: 8 hrs    Energy: fair    Appetite: 2-3 meals    Weight Change: denies        Mental Status Evaluation:    Appearance Adequate hygiene and grooming   Behavior calm and cooperative   Mood euthymic  Depression Scale - 0 of 10 (0 = No depression)  Anxiety Scale - 2 of 10 (0 = No anxiety)   Speech Normal rate and volume   Affect appropriate   Thought Processes Goal directed and coherent   Thought Content Does not verbalize delusional material   Associations Tightly connected   Perceptual Disturbances Denies hallucinations and does not appear to be responding to internal stimuli   Risk Potential Suicidal/Homicidal Ideation - No evidence of suicidal or homicidal ideation and patient does not verbalize suicidal or homicidal ideation  Risk of Violence - No evidence of risk for violence found on assessment  Risk of Self Mutilation - No evidence of risk for self mutilation found on assessment   Orientation oriented to person, place, time/date and situation   Memory recent and remote memory grossly intact   Consciousness alert and awake   Attention/Concentration attention span and concentration appear shorter than expected for age   Insight intact   Judgement intact   Muscle Strength and Gait normal muscle strength and normal muscle tone, normal gait/station and normal balance   Motor Activity no abnormal movements   Language no difficulty naming common objects, no difficulty repeating a phrase, no difficulty writing a sentence   Fund of Knowledge adequate knowledge of current events  adequate fund of knowledge regarding past history  adequate fund of knowledge regarding vocabulary          Past Medical History:    Past Medical History:   Diagnosis Date    Allergic     Anxiety     GERD (gastroesophageal reflux disease)     Medical history  reviewed with no changes     PCOS (polycystic ovarian syndrome)         Past Surgical History:   Procedure Laterality Date    EYE SURGERY      lazy eye     Allergies   Allergen Reactions    Allyl Isothiocyanate Hives     (Dominic Mustard)    Milk (Cow) Diarrhea     Mom states hyper sensitive to milk products    Nexium [Esomeprazole] Hives    Pineapple - Food Allergy Hives, Itching and Tongue Swelling     Substance Abuse History:    Social History     Substance and Sexual Activity   Alcohol Use No     Social History     Substance and Sexual Activity   Drug Use Never     Social History:    Social History     Socioeconomic History    Marital status: Single     Spouse name: Not on file    Number of children: Not on file    Years of education: Not on file    Highest education level: Not on file   Occupational History    Not on file   Tobacco Use    Smoking status: Never    Smokeless tobacco: Never   Substance and Sexual Activity    Alcohol use: No    Drug use: Never    Sexual activity: Not Currently     Partners: Male     Birth control/protection: Condom Male   Other Topics Concern    Not on file   Social History Narrative    Denied:  Caffeine use     Social Determinants of Health     Financial Resource Strain: Low Risk  (2/5/2024)    Received from [facility]    Overall Financial Resource Strain (CARDIA)     Difficulty of Paying Living Expenses: Not very hard   Food Insecurity: Food Insecurity Present (2/5/2024)    Received from [facility]    Hunger Vital Sign     Worried About Running Out of Food in the Last Year: Sometimes true     Ran Out of Food in the Last Year: Never true   Transportation Needs: No Transportation Needs (2/5/2024)    Received from [facility]    PRAPARE - Transportation     Lack of Transportation (Medical): No     Lack of Transportation (Non-Medical): No   Physical Activity: Sufficiently Active (2/5/2024)     Received from [facility]    Exercise Vital Sign     Days of Exercise per Week: 7 days     Minutes of Exercise per Session: 110 min   Stress: No Stress Concern Present (2/5/2024)    Received from [facility] [address] [facility]    [employer] - Occupational Stress Questionnaire     Feeling of Stress : Only a little   Social Connections: Not on file   Intimate Partner Violence: Not on file   Housing Stability: Low Risk  (2/5/2024)    Received from [facility] [address] [facility]    Housing Stability Vital Sign     Unable to Pay for Housing in the Last Year: No     Number of Places Lived in the Last Year: 1     Unstable Housing in the Last Year: No     Family Psychiatric History:     Family History   Problem Relation Age of Onset    Glaucoma Mother     Hyperlipidemia Mother         hypercholesterolemia    Depression Mother     Anxiety disorder Mother     ETHAN disease Father     Hyperlipidemia Father         hypercholesterolemia     History Review:The following portions of the patient's history were reviewed and updated as appropriate: allergies, current medications, past family history, past medical history, past social history, past surgical history and problem list     OBJECTIVE:     Vital signs in last 24 hours:    There were no vitals filed for this visit.  Laboratory Results:   Recent Labs (last 2 months):   No visits with results within 2 Month(s) from this visit.   Latest known visit with results is:   Hospital Outpatient Visit on 10/17/2023   Component Date Value    EXT Preg Test, Ur 10/17/2023 Negative     Control 10/17/2023 Valid     Case Report 10/17/2023                      Value:Surgical Pathology Report                         Case: [id number]                                   Authorizing Provider:  Aravind Garibay DO          Collected:           10/17/2023 0727              Ordering Location:      [facility]        Received:            10/17/2023 1522                                     [facility]                                                             Pathologist:           Carlos Ardon MD                                                                 Specimens:   A) - Small Bowel, NOS                                                                               B) - Stomach                                                                               Final Diagnosis 10/17/2023                      Value:This result contains rich text formatting which cannot be displayed here.    Additional Information 10/17/2023                      Value:This result contains rich text formatting which cannot be displayed here.    Gross Description 10/17/2023                      Value:This result contains rich text formatting which cannot be displayed here.    Clinical Information 10/17/2023                      Value:Cold bx r/o celiac     I have personally reviewed all pertinent laboratory/tests results.    Suicide/Homicide Risk Assessment:    Risk of Harm to Self:  Protective Factors: no current suicidal ideation, access to mental health treatment, compliant with medications, compliant with mental health treatment, connection to community, having a desire to be alive, having a desire to live, having a sense of purpose or meaning in life, healthy fear of risky behaviors and pain, impulse control  Based on today's assessment, Mónica presents the following risk of harm to self: minimal    Risk of Harm to Others:  Based on today's assessment, Mónica presents the following risk of harm to others: none    The following interventions are recommended: referral for psychotherapy    Medications Risks/Benefits:      Risks, Benefits And Possible Side Effects Of Medications:    Discussed risks and benefits of treatment with patient including risk of  suicidality, serotonin syndrome, increased QTc interval and SIADH related to treatment with antidepressants; Risk of induction of manic symptoms in certain patient populations     Controlled Medication Discussion:     Not applicable    Treatment Plan:    Due for update/Updated:     Last treatment plan done 4/25, due 10/25/24    MIRANDA Estrada 04/25/24    This note was shared with patient.   Visit Time    Visit Start Time: 11  Visit Stop Time: 1112  Total Visit Duration:  12 minutes

## 2024-05-31 DIAGNOSIS — F41.1 GENERALIZED ANXIETY DISORDER WITH PANIC ATTACKS: ICD-10-CM

## 2024-05-31 DIAGNOSIS — F41.0 GENERALIZED ANXIETY DISORDER WITH PANIC ATTACKS: ICD-10-CM

## 2024-05-31 DIAGNOSIS — R19.7 NAUSEA VOMITING AND DIARRHEA: ICD-10-CM

## 2024-05-31 DIAGNOSIS — R11.2 NAUSEA VOMITING AND DIARRHEA: ICD-10-CM

## 2024-05-31 RX ORDER — ONDANSETRON 4 MG/1
4 TABLET, ORALLY DISINTEGRATING ORAL EVERY 8 HOURS PRN
Qty: 90 TABLET | Refills: 0 | Status: SHIPPED | OUTPATIENT
Start: 2024-05-31 | End: 2024-06-30

## 2024-06-03 RX ORDER — ESCITALOPRAM OXALATE 20 MG/1
20 TABLET ORAL DAILY
Qty: 90 TABLET | Refills: 0 | Status: SHIPPED | OUTPATIENT
Start: 2024-06-03

## 2024-07-29 ENCOUNTER — TELEPHONE (OUTPATIENT)
Dept: PSYCHIATRY | Facility: CLINIC | Age: 24
End: 2024-07-29

## 2024-07-29 NOTE — TELEPHONE ENCOUNTER
This writer left message for patient to reschedule her 8/8 virtual appt with Dom Hearn to a day that he is virtual.  Ask patient to contact writer directly

## 2024-08-08 ENCOUNTER — TELEMEDICINE (OUTPATIENT)
Dept: PSYCHIATRY | Facility: CLINIC | Age: 24
End: 2024-08-08

## 2024-08-08 DIAGNOSIS — F41.0 GENERALIZED ANXIETY DISORDER WITH PANIC ATTACKS: Primary | ICD-10-CM

## 2024-08-08 DIAGNOSIS — F41.1 GENERALIZED ANXIETY DISORDER WITH PANIC ATTACKS: Primary | ICD-10-CM

## 2024-08-08 PROCEDURE — NC001 PR NO CHARGE

## 2024-08-08 NOTE — PSYCH
No Call. No Show. No Charge    Mónica Godoy no showed 08/08/24 appointment , staff called and left message to reschedule appointment     Treatment Plan not due at this session.

## 2024-09-16 ENCOUNTER — TELEMEDICINE (OUTPATIENT)
Dept: PSYCHIATRY | Facility: CLINIC | Age: 24
End: 2024-09-16

## 2024-09-16 DIAGNOSIS — F41.1 GENERALIZED ANXIETY DISORDER WITH PANIC ATTACKS: Primary | ICD-10-CM

## 2024-09-16 DIAGNOSIS — F41.0 GENERALIZED ANXIETY DISORDER WITH PANIC ATTACKS: Primary | ICD-10-CM

## 2024-09-16 NOTE — PSYCH
No Call. No Show. No Charge    Mónica Godoy no showed 09/16/24 appointment , provider waited 15 minutes for patient.     Treatment Plan not due at this session.

## 2024-10-09 ENCOUNTER — TELEMEDICINE (OUTPATIENT)
Dept: PSYCHIATRY | Facility: CLINIC | Age: 24
End: 2024-10-09
Payer: COMMERCIAL

## 2024-10-09 DIAGNOSIS — F33.8 SEASONAL DEPRESSION (HCC): ICD-10-CM

## 2024-10-09 DIAGNOSIS — F41.1 GENERALIZED ANXIETY DISORDER WITH PANIC ATTACKS: ICD-10-CM

## 2024-10-09 DIAGNOSIS — F41.0 GENERALIZED ANXIETY DISORDER WITH PANIC ATTACKS: ICD-10-CM

## 2024-10-09 DIAGNOSIS — F90.2 ADHD (ATTENTION DEFICIT HYPERACTIVITY DISORDER), COMBINED TYPE: Primary | ICD-10-CM

## 2024-10-09 PROBLEM — K90.41 NON-CELIAC GLUTEN SENSITIVITY: Status: ACTIVE | Noted: 2024-05-10

## 2024-10-09 PROBLEM — Z00.00 ANNUAL PHYSICAL EXAM: Status: RESOLVED | Noted: 2023-01-13 | Resolved: 2024-10-09

## 2024-10-09 PROCEDURE — 99214 OFFICE O/P EST MOD 30 MIN: CPT

## 2024-10-09 RX ORDER — ESCITALOPRAM OXALATE 20 MG/1
20 TABLET ORAL DAILY
Qty: 90 TABLET | Refills: 0 | Status: SHIPPED | OUTPATIENT
Start: 2024-10-09

## 2024-10-09 RX ORDER — ATOMOXETINE 25 MG/1
25 CAPSULE ORAL DAILY
Qty: 30 CAPSULE | Refills: 1 | Status: SHIPPED | OUTPATIENT
Start: 2024-10-09

## 2024-10-09 NOTE — PSYCH
Virtual Regular Visit    Verification of patient location:    Patient is located in the following state in which I hold an active license [place]    Assessment & Plan  Generalized anxiety disorder with panic attacks  Continue Lexapro 20 mg daily  ADHD (attention deficit hyperactivity disorder), combined type  Initiate Strattera 25 mg daily for confirmed ADHD diagnosis by neuropsych         Encounter provider MIRANDA Estrada    Provider located at    [facility]  [address] [facility] [address] [phone]    Recent Visits  No visits were found meeting these conditions.  Showing recent visits within past 7 days and meeting all other requirements  Today's Visits  Date Type Provider Dept   10/09/24 Telemedicine MIRANDA Estrada  [facility]   Showing today's visits and meeting all other requirements  Future Appointments  No visits were found meeting these conditions.  Showing future appointments within next 150 days and meeting all other requirements         The patient was identified by name and date of birth. Mónica Godoy was informed that this is a telemedicine visit and that the visit is being conducted throughthe Epic Embedded platform. She agrees to proceed..  My office door was closed. No one else was in the room.  She acknowledged consent and understanding of privacy and security of the video platform. The patient has agreed to participate and understands they can discontinue the visit at any time.    Patient is aware this is a billable service.     HPI     Current Outpatient Medications   Medication Sig Dispense Refill    dicyclomine (BENTYL) 20 mg tablet Take 1 tablet (20 mg total) by mouth every 6 (six) hours for 20 doses 20 tablet 0    escitalopram (LEXAPRO) 20 mg tablet Take 1 tablet (20 mg total) by mouth daily 90 tablet 0    famotidine (PEPCID) 40 MG tablet Take 1 tablet (40 mg total) by mouth daily 90 tablet  1    medroxyPROGESTERone (PROVERA) 10 mg tablet  (Patient not taking: Reported on 11/9/2023)      ondansetron (ZOFRAN-ODT) 4 mg disintegrating tablet Take 1 tablet (4 mg total) by mouth every 8 (eight) hours as needed for nausea or vomiting 90 tablet 0     No current facility-administered medications for this visit.       Review of Systems  Video Exam    There were no vitals filed for this visit.    Physical Exam   As a result of this visit, I have referred the patient for further respiratory evaluation. No      VIRTUAL VISIT DISCLAIMER    Mónica Godoy acknowledges that she has consented to an online visit or consultation. She understands that the online visit is based solely on information provided by her, and that, in the absence of a face-to-face physical evaluation by the physician, the diagnosis she receives is both limited and provisional in terms of accuracy and completeness. This is not intended to replace a full medical face-to-face evaluation by the physician. Mónica Godoy understands and accepts these terms.    MEDICATION MANAGEMENT NOTE        [facility]    Name and Date of Birth:  Mónica Godoy 24 y.o. 2000 MRN: [id number]    Date of Visit: October 9, 2024    Allergies   Allergen Reactions    Allyl Isothiocyanate Hives     (Dominic Mustard)    Milk (Cow) Diarrhea     Mom states hyper sensitive to milk products    Nexium [Esomeprazole] Hives    Pineapple - Food Allergy Hives, Itching and Tongue Swelling     SUBJECTIVE:    Mónica is seen today for a follow up for depression and Generalized Anxiety Disorder. She reports that she continues to do relatively well since the last visit.  Patient completed official neuropsych testing for ADHD type symptoms,  results received and will be faxed into her chart, testing confirmed ADHD combined type which patient highly suspected.  Symptoms have included difficulties with school dating back to her young childhood years  where she struggled to sit still, pay attention in class, being easily forgetful with appointments and homework, getting distracted easily and being unable to finish products at work.  She is relieved to have an answer to some of the symptoms she has experienced most of her life and is interested in medication management.  She also reports she is currently participating in psychotherapy and learning ways to improve her forgetfulness, being easily distracted.  We discussed medication options, will initiate Strattera 25 mg daily for the above-mentioned symptoms, side effect explained, patient verbalized understanding.  She appears hopeful and optimistic regarding medication initiation, preferred to start a low dose to avoid any significant side effects.  She denies any history of high blood pressure or cardiac history.  Patient continues compliance with Lexapro 20 mg daily for history of mild anxiety and depressive symptoms, reports symptoms are at baseline with no acute exacerbation to report.  Mood is pleasant, euthymic.  Reports adequate sleep and appetite.  Denies SI.        She denies any side effects from medications.    PLAN:    -Continue Lexapro to 20 mg daily for hx depression and anxiety  PARQ completed including serotonin syndrome, SIADH, worsening depression, suicidality, induction of aniceto, GI upset, headaches, activation, sexual side effects, sedation, potential drug interactions, and others.    -Initiate Strattera 25mg daily for dx of ADHD combined type confirmed by neuropsych      Aware of 24 hour and weekend coverage for urgent situations accessed by calling [facility] main practice number  Continue psychotherapy with therapist    Diagnoses and all orders for this visit:    Generalized anxiety disorder with panic attacks    Seasonal depression (HCC)        Current Outpatient Medications on File Prior to Visit   Medication Sig Dispense Refill    dicyclomine (BENTYL) 20 mg tablet  Take 1 tablet (20 mg total) by mouth every 6 (six) hours for 20 doses 20 tablet 0    escitalopram (LEXAPRO) 20 mg tablet Take 1 tablet (20 mg total) by mouth daily 90 tablet 0    famotidine (PEPCID) 40 MG tablet Take 1 tablet (40 mg total) by mouth daily 90 tablet 1    medroxyPROGESTERone (PROVERA) 10 mg tablet  (Patient not taking: Reported on 11/9/2023)      ondansetron (ZOFRAN-ODT) 4 mg disintegrating tablet Take 1 tablet (4 mg total) by mouth every 8 (eight) hours as needed for nausea or vomiting 90 tablet 0     No current facility-administered medications on file prior to visit.       Psychotherapy Provided:       Supportive counseling provided.  Medication changes discussed with Mónica.  Medication education provided to Mónica regarding lexapro     HPI ROS Appetite Changes and Sleep:     She reports normal sleep, adequate appetite, adequate energy level. Denies homicidal ideation, denies suicidal ideation    Review Of Systems:      HPI ROS:               Medication Side Effects:  denies    Depression (10 worst): 0/10    Anxiety (10 worst): 2/10    Safety concerns (SI, HI, etc): Denies si and hi    Sleep: 8 hrs    Energy: fair    Appetite: 2-3 meals    Weight Change: denies        Mental Status Evaluation:    Appearance Adequate hygiene and grooming   Behavior calm and cooperative   Mood euthymic  Depression Scale - 0 of 10 (0 = No depression)  Anxiety Scale - 2 of 10 (0 = No anxiety)   Speech Normal rate and volume   Affect appropriate   Thought Processes Goal directed and coherent   Thought Content Does not verbalize delusional material   Associations Tightly connected   Perceptual Disturbances Denies hallucinations and does not appear to be responding to internal stimuli   Risk Potential Suicidal/Homicidal Ideation - No evidence of suicidal or homicidal ideation and patient does not verbalize suicidal or homicidal ideation  Risk of Violence - No evidence of risk for violence found on assessment  Risk of Self  Mutilation - No evidence of risk for self mutilation found on assessment   Orientation oriented to person, place, time/date and situation   Memory recent and remote memory grossly intact   Consciousness alert and awake   Attention/Concentration attention span and concentration appear shorter than expected for age   Insight intact   Judgement intact   Muscle Strength and Gait normal muscle strength and normal muscle tone, normal gait/station and normal balance   Motor Activity no abnormal movements   Language no difficulty naming common objects, no difficulty repeating a phrase, no difficulty writing a sentence   Fund of Knowledge adequate knowledge of current events  adequate fund of knowledge regarding past history  adequate fund of knowledge regarding vocabulary          Past Medical History:    Past Medical History:   Diagnosis Date    Allergic     Anxiety     GERD (gastroesophageal reflux disease)     Medical history reviewed with no changes     PCOS (polycystic ovarian syndrome)         Past Surgical History:   Procedure Laterality Date    EYE SURGERY      lazy eye     Allergies   Allergen Reactions    Allyl Isothiocyanate Hives     (Dominic Mustard)    Milk (Cow) Diarrhea     Mom states hyper sensitive to milk products    Nexium [Esomeprazole] Hives    Pineapple - Food Allergy Hives, Itching and Tongue Swelling     Substance Abuse History:    Social History     Substance and Sexual Activity   Alcohol Use No     Social History     Substance and Sexual Activity   Drug Use Never     Social History:    Social History     Socioeconomic History    Marital status: Single     Spouse name: Not on file    Number of children: Not on file    Years of education: Not on file    Highest education level: Not on file   Occupational History    Not on file   Tobacco Use    Smoking status: Never    Smokeless tobacco: Never   Substance and Sexual Activity    Alcohol use: No    Drug use: Never    Sexual activity: Not Currently      Partners: Male     Birth control/protection: Condom Male   Other Topics Concern    Not on file   Social History Narrative    Denied:  Caffeine use     Social Determinants of Health     Financial Resource Strain: Low Risk  (2/5/2024)    [facility]    Overall Financial Resource Strain (CARDIA)     Difficulty of Paying Living Expenses: Not very hard   Food Insecurity: Food Insecurity Present (2/5/2024)    [facility]    Hunger Vital Sign     Worried About Running Out of Food in the Last Year: Sometimes true     Ran Out of Food in the Last Year: Never true   Transportation Needs: No Transportation Needs (2/5/2024)    [facility]    PRAPARE - Transportation     Lack of Transportation (Medical): No     Lack of Transportation (Non-Medical): No   Physical Activity: Sufficiently Active (2/5/2024)    [facility] from [facility]    Exercise Vital Sign     Days of Exercise per Week: 7 days     Minutes of Exercise per Session: 110 min   Stress: No Stress Concern Present (2/5/2024)    [facility]    [employer] [place] [employer] - Occupational Stress Questionnaire     Feeling of Stress : Only a little   Social Connections: Unknown (6/18/2024)    Received from [facility]    Social Connections     How often do you feel lonely or isolated from those around you? (Adult - for ages 18 years and over): Not on file   Intimate Partner Violence: Not on file   Housing Stability: Low Risk  (2/5/2024)    [facility]    Housing  Stability Vital Sign     Unable to Pay for Housing in the Last Year: No     Number of Places Lived in the Last Year: 1     Unstable Housing in the Last Year: No     Family Psychiatric History:     Family History   Problem Relation Age of Onset    Glaucoma Mother     Hyperlipidemia Mother         hypercholesterolemia    Depression Mother     Anxiety disorder Mother     ETHAN disease Father     Hyperlipidemia Father         hypercholesterolemia     History Review:The following portions of the patient's history were reviewed and updated as appropriate: allergies, current medications, past family history, past medical history, past social history, past surgical history and problem list     OBJECTIVE:     Vital signs in last 24 hours:    There were no vitals filed for this visit.  Laboratory Results:   Recent Labs (last 2 months):   No visits with results within 2 Month(s) from this visit.   Latest known visit with results is:   Hospital Outpatient Visit on 10/17/2023   Component Date Value    EXT Preg Test, Ur 10/17/2023 Negative     Control 10/17/2023 Valid     Case Report 10/17/2023                      Value:Surgical Pathology Report                         Case: [id number]                                   Authorizing Provider:  Aravind Garibay DO          Collected:           10/17/2023 0727              Ordering Location:      [facility]       Received:            10/17/2023 [address] [place] [address] Endoscopy                                                             Pathologist:           Carlos Ardon MD                                                                 Specimens:   A) - Small Bowel, NOS                                                                               B) - Stomach                                                                               Final Diagnosis  10/17/2023                      Value:A. Small Bowel, NOS:                          - Small bowel mucosa with no significant histopathologic abnormality.                          - Negative for malabsorption pattern.                          - Negative for malignancy.                                                     B. Stomach:                          - Gastric mucosa with no significant histopathologic abnormality.                          - Negative for H. pylori by routine H&E.                          - Negative for malignancy.    Additional Information 10/17/2023                      Value:All reported additional testing was performed with appropriately reactive                           controls.  These tests were developed and their performance                           characteristics determined by [facility] or                           appropriate performing facility, though some tests may be performed on                           tissues which have not been validated for performance characteristics                           (such as staining performed on alcohol exposed cell blocks and decalcified                           tissues).  Results should be interpreted with caution and in the context                           of the patients’ clinical condition. These tests may not be cleared or                           approved by the U.S. Food and Drug Administration, though the FDA has                           determined that such clearance or approval is not necessary. These tests                           are used for clinical purposes and they should not be regarded as                           investigational or for research. This laboratory has been approved by CLIA                           88, designated as a high-complexity laboratory and is qualified to perform                           these tests.                          .    Gross Description 10/17/2023                  "     Value:A. The specimen is received in formalin, labeled with the patient's name                           and hospital number, and is designated \" small bowel\".  The specimen                           consists of multiple tan soft tissue fragments measuring 0.6 x 0.6 x 0.2                           cm.  Entirely submitted. One screened cassette.                          B. The specimen is received in formalin, labeled with the patient's name                           and hospital number, and is designated \" stomach\".  The specimen consists                           of multiple tan soft tissue fragments measuring in aggregate of 0.7 x 0.5                           x 0.2 cm.  Entirely submitted. One screened cassette.                                                    Note: The estimated total formalin fixation time based upon information                           provided by the submitting clinician and the standard processing schedule                           is under 72 hours.                                                                              [facility]    Clinical Information 10/17/2023                      Value:Cold bx r/o celiac     I have personally reviewed all pertinent laboratory/tests results.    Suicide/Homicide Risk Assessment:    Risk of Harm to Self:  Protective Factors: no current suicidal ideation, access to mental health treatment, compliant with medications, compliant with mental health treatment, connection to community, having a desire to be alive, having a desire to live, having a sense of purpose or meaning in life, healthy fear of risky behaviors and pain, impulse control  Based on today's assessment, Mónica presents the following risk of harm to self: minimal    Risk of Harm to Others:  Based on today's assessment, Mónica presents the following risk of harm to others: none    The following interventions are recommended: referral for psychotherapy    Medications Risks/Benefits:  "     Risks, Benefits And Possible Side Effects Of Medications:    Discussed risks and benefits of treatment with patient including risk of suicidality, serotonin syndrome, increased QTc interval and SIADH related to treatment with antidepressants; Risk of induction of manic symptoms in certain patient populations     Controlled Medication Discussion:     Not applicable    Treatment Plan:    Due for update/Updated:     Last treatment plan done 4/25, due 10/25/24    MIRANDA Estrada 10/09/24    This note was shared with patient.   Visit Time    Visit Start Time: 320  Visit Stop Time:343  Total Visit Duration:  23 minutes

## 2024-12-06 ENCOUNTER — APPOINTMENT (OUTPATIENT)
Dept: LAB | Facility: CLINIC | Age: 24
End: 2024-12-06
Payer: COMMERCIAL

## 2024-12-06 DIAGNOSIS — R63.5 ABNORMAL WEIGHT GAIN: ICD-10-CM

## 2024-12-06 DIAGNOSIS — E28.2 POLYCYSTIC OVARIES: ICD-10-CM

## 2024-12-06 LAB
ALBUMIN SERPL BCG-MCNC: 4.6 G/DL (ref 3.5–5)
ALP SERPL-CCNC: 59 U/L (ref 34–104)
ALT SERPL W P-5'-P-CCNC: 29 U/L (ref 7–52)
ANION GAP SERPL CALCULATED.3IONS-SCNC: 7 MMOL/L (ref 4–13)
AST SERPL W P-5'-P-CCNC: 24 U/L (ref 13–39)
BILIRUB SERPL-MCNC: 0.7 MG/DL (ref 0.2–1)
BUN SERPL-MCNC: 13 MG/DL (ref 5–25)
CALCIUM SERPL-MCNC: 9.2 MG/DL (ref 8.4–10.2)
CHLORIDE SERPL-SCNC: 104 MMOL/L (ref 96–108)
CHOLEST SERPL-MCNC: 255 MG/DL (ref ?–200)
CO2 SERPL-SCNC: 25 MMOL/L (ref 21–32)
CREAT SERPL-MCNC: 0.78 MG/DL (ref 0.6–1.3)
ESTRADIOL SERPL-MCNC: 41.3 PG/ML
FSH SERPL-ACNC: 5.4 MIU/ML
GFR SERPL CREATININE-BSD FRML MDRD: 107 ML/MIN/1.73SQ M
GLUCOSE P FAST SERPL-MCNC: 83 MG/DL (ref 65–99)
HDLC SERPL-MCNC: 44 MG/DL
INSULIN SERPL-ACNC: 10.53 UIU/ML (ref 1.9–23)
LDLC SERPL CALC-MCNC: 171 MG/DL (ref 0–100)
LH SERPL-ACNC: 15.8 MIU/ML
NONHDLC SERPL-MCNC: 211 MG/DL
POTASSIUM SERPL-SCNC: 3.6 MMOL/L (ref 3.5–5.3)
PROT SERPL-MCNC: 7.1 G/DL (ref 6.4–8.4)
SODIUM SERPL-SCNC: 136 MMOL/L (ref 135–147)
TRIGL SERPL-MCNC: 201 MG/DL (ref ?–150)
TSH SERPL DL<=0.05 MIU/L-ACNC: 0.76 UIU/ML (ref 0.45–4.5)

## 2024-12-06 PROCEDURE — 83525 ASSAY OF INSULIN: CPT

## 2024-12-06 PROCEDURE — 82670 ASSAY OF TOTAL ESTRADIOL: CPT

## 2024-12-06 PROCEDURE — 84402 ASSAY OF FREE TESTOSTERONE: CPT

## 2024-12-06 PROCEDURE — 83002 ASSAY OF GONADOTROPIN (LH): CPT

## 2024-12-06 PROCEDURE — 84403 ASSAY OF TOTAL TESTOSTERONE: CPT

## 2024-12-06 PROCEDURE — 80061 LIPID PANEL: CPT

## 2024-12-06 PROCEDURE — 36415 COLL VENOUS BLD VENIPUNCTURE: CPT

## 2024-12-06 PROCEDURE — 84443 ASSAY THYROID STIM HORMONE: CPT

## 2024-12-06 PROCEDURE — 83498 ASY HYDROXYPROGESTERONE 17-D: CPT

## 2024-12-06 PROCEDURE — 83001 ASSAY OF GONADOTROPIN (FSH): CPT

## 2024-12-06 PROCEDURE — 80053 COMPREHEN METABOLIC PANEL: CPT

## 2024-12-06 PROCEDURE — 82627 DEHYDROEPIANDROSTERONE: CPT

## 2024-12-06 PROCEDURE — 83036 HEMOGLOBIN GLYCOSYLATED A1C: CPT

## 2024-12-07 LAB
EST. AVERAGE GLUCOSE BLD GHB EST-MCNC: 103 MG/DL
HBA1C MFR BLD: 5.2 %

## 2024-12-10 LAB
DHEA-S SERPL-MCNC: 370 UG/DL (ref 110–431.7)
TESTOST FREE SERPL-MCNC: 6.5 PG/ML (ref 0–4.2)
TESTOST SERPL-MCNC: 74 NG/DL (ref 13–71)

## 2024-12-11 LAB — 17OHP SERPL-MCNC: 88 NG/DL

## 2025-01-24 ENCOUNTER — RESULTS FOLLOW-UP (OUTPATIENT)
Dept: FAMILY MEDICINE CLINIC | Facility: CLINIC | Age: 25
End: 2025-01-24

## 2025-01-24 ENCOUNTER — OFFICE VISIT (OUTPATIENT)
Dept: FAMILY MEDICINE CLINIC | Facility: CLINIC | Age: 25
End: 2025-01-24
Payer: COMMERCIAL

## 2025-01-24 ENCOUNTER — APPOINTMENT (OUTPATIENT)
Dept: RADIOLOGY | Facility: CLINIC | Age: 25
End: 2025-01-24
Payer: COMMERCIAL

## 2025-01-24 VITALS
WEIGHT: 236 LBS | DIASTOLIC BLOOD PRESSURE: 72 MMHG | OXYGEN SATURATION: 97 % | TEMPERATURE: 98.6 F | HEART RATE: 87 BPM | BODY MASS INDEX: 34.96 KG/M2 | HEIGHT: 69 IN | SYSTOLIC BLOOD PRESSURE: 112 MMHG

## 2025-01-24 DIAGNOSIS — M25.571 ACUTE RIGHT ANKLE PAIN: Primary | ICD-10-CM

## 2025-01-24 DIAGNOSIS — F41.0 GENERALIZED ANXIETY DISORDER WITH PANIC ATTACKS: ICD-10-CM

## 2025-01-24 DIAGNOSIS — Z00.00 ANNUAL PHYSICAL EXAM: ICD-10-CM

## 2025-01-24 DIAGNOSIS — M25.571 ACUTE RIGHT ANKLE PAIN: ICD-10-CM

## 2025-01-24 DIAGNOSIS — F41.1 GENERALIZED ANXIETY DISORDER WITH PANIC ATTACKS: ICD-10-CM

## 2025-01-24 PROCEDURE — 99213 OFFICE O/P EST LOW 20 MIN: CPT

## 2025-01-24 PROCEDURE — 99395 PREV VISIT EST AGE 18-39: CPT

## 2025-01-24 PROCEDURE — 73610 X-RAY EXAM OF ANKLE: CPT

## 2025-01-24 RX ORDER — ESCITALOPRAM OXALATE 20 MG/1
20 TABLET ORAL DAILY
Qty: 90 TABLET | Refills: 0 | Status: SHIPPED | OUTPATIENT
Start: 2025-01-24

## 2025-01-24 NOTE — PROGRESS NOTES
"Adult Annual Physical  Name: Mónica Godoy      : 2000      MRN: [id number]  Encounter Provider: Kimber Quesada PA-C  Encounter Date: 2025   Encounter department: [facility]    Assessment & Plan  Acute right ankle pain  Ankle pain x 2 months after \"twisting\" injury   On exam, pain to palpation of lateral right ankle with mild soft tissue swelling; otherwise unremarkable exam findings and neurovascularly intact which is reassuring  We will obtain an XR of the joint for further evaluation  Otherwise, continue supportive care with tylenol/motrin, elevation and ice  Will make further tx recommendation once XR results are received     Orders:    XR ankle 3+ vw right; Future    Generalized anxiety disorder with panic attacks  Well controlled on current lexapro 20 mg QD which she reports she has been on for years; refill sent in today     Orders:    escitalopram (LEXAPRO) 20 mg tablet; Take 1 tablet (20 mg total) by mouth daily    Annual physical exam  Physical exam unremarkable; BP WNL   Up to date on routine labs  Follows with GYN, up to date on pap   Follow up yearly for annual physical, sooner as needed       Immunizations and preventive care screenings were discussed with patient today. Appropriate education was printed on patient's after visit summary.    Counseling:  Dental Health: discussed importance of regular tooth brushing, flossing, and dental visits.  Exercise: the importance of regular exercise/physical activity was discussed. Recommend exercise 3-5 times per week for at least 30 minutes.          History of Present Illness       Presents for routine physical and right ankle pain  Reports twisted right ankle two months ago -- initially swollen and bruised which has resolved however still painful with activity. Reports pain is on the outer ankle, worse when she is working out at the gym and at night.   No numbness or tingling.   Has not taken anything for symptoms.     Adult " Annual Physical:  Patient presents for annual physical.     Diet and Physical Activity:  - Diet/Nutrition: well balanced diet and consuming 3-5 servings of fruits/vegetables daily.  - Exercise: moderate cardiovascular exercise.    Depression Screening:  - PHQ-2 Score: 0    General Health:  - Sleep: sleeps well.  - Hearing: normal hearing bilateral ears.  - Vision: wears glasses and goes for regular eye exams.  - Dental: regular dental visits and brushes teeth twice daily.    /GYN Health:  - Follows with GYN: yes.   - Menopause: premenopausal.     Review of Systems   Constitutional:  Negative for chills, diaphoresis and fever.   Respiratory:  Negative for cough, chest tightness, shortness of breath and wheezing.    Cardiovascular:  Negative for chest pain, palpitations and leg swelling.   Gastrointestinal:  Negative for abdominal pain, blood in stool, constipation, diarrhea, nausea and vomiting.   Musculoskeletal:  Positive for arthralgias and joint swelling.   Neurological:  Negative for dizziness, light-headedness and headaches.   Psychiatric/Behavioral:  Negative for sleep disturbance.      Medical History Reviewed by provider this encounter:  Tobacco  Allergies  Meds  Problems  Med Hx  Surg Hx  Fam Hx     .  Past Medical History   Past Medical History:   Diagnosis Date    Allergic     Anxiety     GERD (gastroesophageal reflux disease)     Medical history reviewed with no changes     PCOS (polycystic ovarian syndrome)      Past Surgical History:   Procedure Laterality Date    EYE SURGERY      lazy eye     Family History   Problem Relation Age of Onset    Glaucoma Mother     Hyperlipidemia Mother         hypercholesterolemia    Depression Mother     Anxiety disorder Mother     ETHAN disease Father     Hyperlipidemia Father         hypercholesterolemia      reports that she has never smoked. She has never used smokeless tobacco. She reports that she does not drink alcohol and does not use drugs.  Current  Outpatient Medications on File Prior to Visit   Medication Sig Dispense Refill    medroxyPROGESTERone (PROVERA) 10 mg tablet  (Patient not taking: Reported on 11/9/2023)      [DISCONTINUED] atoMOXetine (STRATTERA) 25 mg capsule Take 1 capsule (25 mg total) by mouth daily 30 capsule 1    [DISCONTINUED] dicyclomine (BENTYL) 20 mg tablet Take 1 tablet (20 mg total) by mouth every 6 (six) hours for 20 doses 20 tablet 0    [DISCONTINUED] escitalopram (LEXAPRO) 20 mg tablet Take 1 tablet (20 mg total) by mouth daily 90 tablet 0    [DISCONTINUED] famotidine (PEPCID) 40 MG tablet Take 1 tablet (40 mg total) by mouth daily 90 tablet 1    [DISCONTINUED] ondansetron (ZOFRAN-ODT) 4 mg disintegrating tablet Take 1 tablet (4 mg total) by mouth every 8 (eight) hours as needed for nausea or vomiting 90 tablet 0     No current facility-administered medications on file prior to visit.     Allergies   Allergen Reactions    Allyl Isothiocyanate Hives     (Dominic Mustard)    Milk (Cow) Diarrhea     Mom states hyper sensitive to milk products    Nexium [Esomeprazole] Hives    Pineapple - Food Allergy Hives, Itching and Tongue Swelling      Current Outpatient Medications on File Prior to Visit   Medication Sig Dispense Refill    medroxyPROGESTERone (PROVERA) 10 mg tablet  (Patient not taking: Reported on 11/9/2023)      [DISCONTINUED] atoMOXetine (STRATTERA) 25 mg capsule Take 1 capsule (25 mg total) by mouth daily 30 capsule 1    [DISCONTINUED] dicyclomine (BENTYL) 20 mg tablet Take 1 tablet (20 mg total) by mouth every 6 (six) hours for 20 doses 20 tablet 0    [DISCONTINUED] escitalopram (LEXAPRO) 20 mg tablet Take 1 tablet (20 mg total) by mouth daily 90 tablet 0    [DISCONTINUED] famotidine (PEPCID) 40 MG tablet Take 1 tablet (40 mg total) by mouth daily 90 tablet 1    [DISCONTINUED] ondansetron (ZOFRAN-ODT) 4 mg disintegrating tablet Take 1 tablet (4 mg total) by mouth every 8 (eight) hours as needed for nausea or vomiting 90 tablet 0  "    No current facility-administered medications on file prior to visit.      Social History     Tobacco Use    Smoking status: Never    Smokeless tobacco: Never   Vaping Use    Vaping status: Never Used   Substance and Sexual Activity    Alcohol use: No    Drug use: Never    Sexual activity: Not Currently     Partners: Male     Birth control/protection: Condom Male     Objective   /72   Pulse 87   Temp 98.6 °F (37 °C)   Ht 5' 9\" (1.753 m)   Wt 107 kg (236 lb)   SpO2 97%   BMI 34.85 kg/m²     Physical Exam  Vitals reviewed.   Constitutional:       General: She is not in acute distress.     Appearance: Normal appearance. She is not ill-appearing or diaphoretic.   HENT:      Head: Normocephalic and atraumatic.      Right Ear: Tympanic membrane, ear canal and external ear normal. There is no impacted cerumen.      Left Ear: Tympanic membrane, ear canal and external ear normal. There is no impacted cerumen.      Nose: Nose normal. No congestion or rhinorrhea.      Mouth/Throat:      Mouth: Mucous membranes are moist.      Pharynx: Oropharynx is clear. No oropharyngeal exudate or posterior oropharyngeal erythema.   Eyes:      General:         Right eye: No discharge.         Left eye: No discharge.      Conjunctiva/sclera: Conjunctivae normal.   Cardiovascular:      Rate and Rhythm: Normal rate and regular rhythm.      Pulses: Normal pulses.      Heart sounds: Normal heart sounds. No murmur heard.  Pulmonary:      Effort: Pulmonary effort is normal. No respiratory distress.      Breath sounds: Normal breath sounds. No wheezing, rhonchi or rales.   Abdominal:      General: Bowel sounds are normal. There is no distension.      Palpations: Abdomen is soft.      Tenderness: There is no abdominal tenderness.   Musculoskeletal:         General: Normal range of motion.      Cervical back: Normal range of motion and neck supple.      Right lower leg: No edema.      Left lower leg: No edema.        " Feet:    Lymphadenopathy:      Cervical: No cervical adenopathy.   Skin:     General: Skin is warm.   Neurological:      General: No focal deficit present.      Mental Status: She is alert.      Gait: Gait normal.   Psychiatric:         Mood and Affect: Mood normal.

## 2025-01-28 ENCOUNTER — TELEPHONE (OUTPATIENT)
Dept: PSYCHIATRY | Facility: CLINIC | Age: 25
End: 2025-01-28

## 2025-02-17 ENCOUNTER — OFFICE VISIT (OUTPATIENT)
Dept: FAMILY MEDICINE CLINIC | Facility: CLINIC | Age: 25
End: 2025-02-17
Payer: COMMERCIAL

## 2025-02-17 VITALS
OXYGEN SATURATION: 98 % | HEIGHT: 69 IN | HEART RATE: 102 BPM | SYSTOLIC BLOOD PRESSURE: 120 MMHG | TEMPERATURE: 97.5 F | BODY MASS INDEX: 34.9 KG/M2 | WEIGHT: 235.6 LBS | DIASTOLIC BLOOD PRESSURE: 70 MMHG

## 2025-02-17 DIAGNOSIS — F41.9 ANXIETY: Primary | ICD-10-CM

## 2025-02-17 DIAGNOSIS — F90.0 ATTENTION DEFICIT HYPERACTIVITY DISORDER (ADHD), PREDOMINANTLY INATTENTIVE TYPE: ICD-10-CM

## 2025-02-17 PROBLEM — K52.9 ENTERITIS: Status: RESOLVED | Noted: 2023-07-27 | Resolved: 2025-02-17

## 2025-02-17 PROCEDURE — 99213 OFFICE O/P EST LOW 20 MIN: CPT | Performed by: NURSE PRACTITIONER

## 2025-02-17 RX ORDER — BUPROPION HYDROCHLORIDE 150 MG/1
150 TABLET ORAL EVERY MORNING
Qty: 30 TABLET | Refills: 5 | Status: SHIPPED | OUTPATIENT
Start: 2025-02-17 | End: 2025-08-16

## 2025-02-17 NOTE — PROGRESS NOTES
Name: Mónica Godoy      : 2000      MRN: [id number]  Encounter Provider: MIRANDA Ye  Encounter Date: 2025   Encounter department: [facility]  :  Assessment & Plan  Anxiety  Lexapro 20 mg doing well on the medication and will continue   Orders:  •  buPROPion (WELLBUTRIN XL) 150 mg 24 hr tablet; Take 1 tablet (150 mg total) by mouth every morning    Attention deficit hyperactivity disorder (ADHD), predominantly inattentive type    Orders:  •  buPROPion (WELLBUTRIN XL) 150 mg 24 hr tablet; Take 1 tablet (150 mg total) by mouth every morning  Patient did not notice any benefit from the strattera and has been off the medication for the past two months          History of Present Illness   Patient here today and reports that she was layed off from her employment and is working on getting new job and working through that process. Patient reports that she was diagnosed with ADHD and had been on Strattera and was following with psychiatry in [place] and was nausea from the medication and she was planning to follow up and then just not able to see them anymore. Patient is currently taking Lexapro 20 mg and is effective for her anxiety that she has.       Review of Systems   Constitutional:  Negative for activity change, appetite change, chills, diaphoresis, fatigue, fever and unexpected weight change.   HENT:  Negative for congestion, ear pain, hearing loss, postnasal drip, sinus pressure, sinus pain, sneezing and sore throat.    Eyes:  Negative for pain, redness and visual disturbance.   Respiratory:  Negative for cough and shortness of breath.    Cardiovascular:  Negative for chest pain and leg swelling.   Gastrointestinal:  Negative for abdominal pain, diarrhea, nausea and vomiting.   Endocrine: Negative.    Genitourinary: Negative.    Musculoskeletal:  Negative for arthralgias.   Skin: Negative.    Allergic/Immunologic: Negative.    Neurological:  Negative for  "dizziness and light-headedness.   Hematological: Negative.    Psychiatric/Behavioral:  Positive for dysphoric mood. Negative for behavioral problems.        Objective   /70   Pulse 102   Temp 97.5 °F (36.4 °C)   Ht 5' 9\" (1.753 m)   Wt 107 kg (235 lb 9.6 oz)   SpO2 98%   BMI 34.79 kg/m²      Physical Exam  Constitutional:       General: She is not in acute distress.     Appearance: She is well-developed.   HENT:      Head: Normocephalic and atraumatic.      Right Ear: Tympanic membrane normal.      Left Ear: Tympanic membrane normal.      Nose: Nose normal.      Mouth/Throat:      Mouth: Mucous membranes are moist.   Eyes:      Pupils: Pupils are equal, round, and reactive to light.   Neck:      Thyroid: No thyromegaly.   Cardiovascular:      Rate and Rhythm: Normal rate and regular rhythm.      Heart sounds: Normal heart sounds. No murmur heard.  Pulmonary:      Effort: Pulmonary effort is normal. No respiratory distress.      Breath sounds: Normal breath sounds. No wheezing.   Abdominal:      General: Bowel sounds are normal.      Palpations: Abdomen is soft.   Musculoskeletal:         General: Normal range of motion.      Cervical back: Normal range of motion.   Skin:     General: Skin is warm and dry.   Neurological:      General: No focal deficit present.      Mental Status: She is alert and oriented to person, place, and time.   Psychiatric:         Mood and Affect: Mood normal.       RUSLAN-7 Flowsheet Screening    Flowsheet Row Most Recent Value   Over the last two weeks, how often have you been bothered by the following problems?     Feeling nervous, anxious, or on edge 0   Not being able to stop or control worrying 0   Worrying too much about different things 0   Trouble relaxing  0   Being so restless that it's hard to sit still 0   Becoming easily annoyed or irritable  0   Feeling afraid as if something awful might happen 0   How difficult have these problems made it for you to do your work, " take care of things at home, or get along with other people?  Not difficult at all   RUSLAN Score  0         PHQ-2/9 Depression Screening    Little interest or pleasure in doing things: 0 - not at all  Feeling down, depressed, or hopeless: 0 - not at all  PHQ-2 Score: 0  PHQ-2 Interpretation: Negative depression screen

## 2025-02-17 NOTE — ASSESSMENT & PLAN NOTE
Lexapro 20 mg doing well on the medication and will continue   Orders:  •  buPROPion (WELLBUTRIN XL) 150 mg 24 hr tablet; Take 1 tablet (150 mg total) by mouth every morning

## 2025-02-17 NOTE — ASSESSMENT & PLAN NOTE
Orders:  •  buPROPion (WELLBUTRIN XL) 150 mg 24 hr tablet; Take 1 tablet (150 mg total) by mouth every morning  Patient did not notice any benefit from the strattera and has been off the medication for the past two months

## 2025-02-27 ENCOUNTER — TELEMEDICINE (OUTPATIENT)
Dept: OTHER | Facility: HOSPITAL | Age: 25
End: 2025-02-27

## 2025-02-27 DIAGNOSIS — H92.03 EARACHE SYMPTOMS, BILATERAL: Primary | ICD-10-CM

## 2025-02-27 RX ORDER — AMOXICILLIN 500 MG/1
500 CAPSULE ORAL EVERY 8 HOURS SCHEDULED
Qty: 30 CAPSULE | Refills: 0 | Status: SHIPPED | OUTPATIENT
Start: 2025-02-27 | End: 2025-03-09

## 2025-02-27 NOTE — PATIENT INSTRUCTIONS
Will start antibiotic for presumed ear infection.  Tylenol or motrin as needed.  You can start Flonase which may help.  Follow up with PCP if no improvement. Go to Er with any worsening symptoms.

## 2025-02-27 NOTE — PROGRESS NOTES
Virtual Regular Visit  Name: Mónica Godoy      : 2000      MRN: [id number]  Encounter Provider: MIRANDA Ortega  Encounter Date: 2025   Encounter department: [facility]       Verification of patient location:  Patient is located at Home in the following state in which I hold an active license [place] :  Assessment & Plan  Earache symptoms, bilateral    Orders:    amoxicillin (AMOXIL) 500 mg capsule; Take 1 capsule (500 mg total) by mouth every 8 (eight) hours for 10 days        Encounter provider MIRANDA Ortega    The patient was identified by name and date of birth. Mónica Godoy was informed that this is a telemedicine visit and that the visit is being conducted through the Epic Embedded platform. She agrees to proceed..  My office door was closed. No one else was in the room.  She acknowledged consent and understanding of privacy and security of the video platform. The patient has agreed to participate and understands they can discontinue the visit at any time.    Patient is aware this is a billable service.     History obtained from: patient  History of Present Illness     This is a 24 year old female here today for video visit.  She states she is getting over a URI.  She states for the last 3 nights she has been having ear pain and some drainage.  She states swallowing.  She states she had a cold about 2 weeks ago. No fevers now.  She states does not have a cough.  She states she tried OTC allergy medication motrin and tylenol.       Review of Systems   Constitutional:  Negative for activity change, chills, fatigue and fever.   HENT:  Positive for congestion, ear pain and rhinorrhea.    Respiratory: Negative.     Gastrointestinal: Negative.    Neurological: Negative.    Psychiatric/Behavioral: Negative.         Objective   There were no vitals taken for this visit.    Physical Exam  Constitutional:       General: She is not in acute distress.     Appearance: Normal appearance. She is not  ill-appearing or toxic-appearing.   HENT:      Head: Normocephalic and atraumatic.      Nose: Congestion present.   Neurological:      Mental Status: She is alert and oriented to person, place, and time.   Psychiatric:         Mood and Affect: Mood normal.         Behavior: Behavior normal.         Thought Content: Thought content normal.         Judgment: Judgment normal.         Visit Time  Total Visit Duration: 5 minutes not including the time spent for establishing the audio/video connection.

## 2025-03-02 ENCOUNTER — OFFICE VISIT (OUTPATIENT)
Dept: URGENT CARE | Facility: CLINIC | Age: 25
End: 2025-03-02
Payer: COMMERCIAL

## 2025-03-02 VITALS
RESPIRATION RATE: 16 BRPM | HEART RATE: 118 BPM | WEIGHT: 229.5 LBS | BODY MASS INDEX: 33.89 KG/M2 | OXYGEN SATURATION: 98 % | DIASTOLIC BLOOD PRESSURE: 74 MMHG | TEMPERATURE: 98.1 F | SYSTOLIC BLOOD PRESSURE: 118 MMHG

## 2025-03-02 DIAGNOSIS — H60.503 ACUTE OTITIS EXTERNA OF BOTH EARS, UNSPECIFIED TYPE: Primary | ICD-10-CM

## 2025-03-02 PROCEDURE — S9083 URGENT CARE CENTER GLOBAL: HCPCS

## 2025-03-02 PROCEDURE — G0382 LEV 3 HOSP TYPE B ED VISIT: HCPCS

## 2025-03-02 RX ORDER — OFLOXACIN 3 MG/ML
10 SOLUTION AURICULAR (OTIC) DAILY
Qty: 5 ML | Refills: 0 | Status: SHIPPED | OUTPATIENT
Start: 2025-03-02

## 2025-03-02 NOTE — PROGRESS NOTES
[facility]        NAME: Mónica Godoy is a 24 y.o. female  : 2000    MRN: [id number]  DATE: 2025  TIME: 11:49 AM    Assessment and Plan   Acute otitis externa of both ears, unspecified type [H60.503]  1. Acute otitis externa of both ears, unspecified type  ofloxacin (FLOXIN) 0.3 % otic solution            Patient Instructions     Use Ofloxacin drops as prescribed  Avoid Q-Tip use  Tylenol/Ibuprofen for discomfort  Fluids  Change pillowcase daily  Avoid using ear buds until treatment finished. Clean ear buds often.  Avoid submerging head under water until finished with drops, or wear ear plugs when swimming.    Return to clinic if ear canal is too swollen for drops to be instilled for possible ear wic insertion.   Follow up with PCP in 3-5 days.  Proceed to the ER with worsening symptoms.    Chief Complaint     Chief Complaint   Patient presents with    Earache     Both ears ache and pressure, some drainage, left side worse. Dizziness. Fever, chills. Pt states she had a sinus pressure 3 weeks ago. Taking acetaminopen and ibuprofen. Telehealth done 4 days ago and is currently on amox. Sx for 3 days.          History of Present Illness       The patient presents today with complaints of BL ear pain and drainage x 3 days. Had some dizziness this morning. Is currently on amoxicillin for a sinus infection. Also taking tylenol/motrin as needed for the pain. Denies fever/chills. States sinus symptoms seem to be improving.         Review of Systems   Review of Systems   Constitutional:  Negative for chills and fever.   HENT:  Positive for ear discharge and ear pain. Negative for congestion, postnasal drip, rhinorrhea, sinus pressure, sinus pain and sore throat.    Respiratory:  Negative for cough.    Neurological:  Positive for dizziness.         Current Medications       Current Outpatient Medications:     amoxicillin (AMOXIL) 500 mg capsule, Take 1 capsule (500 mg total) by mouth every 8 (eight)  hours for 10 days, Disp: 30 capsule, Rfl: 0    buPROPion (WELLBUTRIN XL) 150 mg 24 hr tablet, Take 1 tablet (150 mg total) by mouth every morning, Disp: 30 tablet, Rfl: 5    escitalopram (LEXAPRO) 20 mg tablet, Take 1 tablet (20 mg total) by mouth daily, Disp: 90 tablet, Rfl: 0    ofloxacin (FLOXIN) 0.3 % otic solution, Administer 10 drops into both ears daily, Disp: 5 mL, Rfl: 0    medroxyPROGESTERone (PROVERA) 10 mg tablet, , Disp: , Rfl:     Current Allergies     Allergies as of 03/02/2025 - Reviewed 03/02/2025   Allergen Reaction Noted    Allyl isothiocyanate Hives 02/05/2022    Milk (cow) Diarrhea 03/10/2019    Nexium [esomeprazole] Hives 07/14/2023    Pineapple - food allergy Hives, Itching, and Tongue Swelling 01/10/2024            The following portions of the patient's history were reviewed and updated as appropriate: allergies, current medications, past family history, past medical history, past social history, past surgical history and problem list.     Past Medical History:   Diagnosis Date    Allergic     Anxiety     GERD (gastroesophageal reflux disease)     Medical history reviewed with no changes     PCOS (polycystic ovarian syndrome)        Past Surgical History:   Procedure Laterality Date    EYE SURGERY      lazy eye       Family History   Problem Relation Age of Onset    Glaucoma Mother     Hyperlipidemia Mother         hypercholesterolemia    Depression Mother     Anxiety disorder Mother     ETHAN disease Father     Hyperlipidemia Father         hypercholesterolemia         Medications have been verified.        Objective   /74   Pulse (!) 118   Temp 98.1 °F (36.7 °C)   Resp 16   Wt 104 kg (229 lb 8 oz)   SpO2 98%   BMI 33.89 kg/m²        Physical Exam     Physical Exam  Vitals and nursing note reviewed.   Constitutional:       General: She is not in acute distress.     Appearance: Normal appearance. She is not ill-appearing.   HENT:      Head: Normocephalic and atraumatic.      Right  Ear: Tympanic membrane, ear canal and external ear normal. No drainage. Tympanic membrane is not erythematous or bulging.      Left Ear: Tympanic membrane, ear canal and external ear normal. No drainage. Tympanic membrane is not erythematous or bulging.      Ears:      Comments: BL TM WNL. BL canals with mild erythema. No drainage noted.      Nose: Nose normal. No congestion or rhinorrhea.      Mouth/Throat:      Lips: Pink.      Mouth: Mucous membranes are moist.      Pharynx: No oropharyngeal exudate or posterior oropharyngeal erythema.      Tonsils: No tonsillar exudate.   Eyes:      General: Vision grossly intact.      Extraocular Movements: Extraocular movements intact.      Pupils: Pupils are equal, round, and reactive to light.   Cardiovascular:      Rate and Rhythm: Normal rate.   Pulmonary:      Effort: Pulmonary effort is normal.   Musculoskeletal:         General: Normal range of motion.      Cervical back: Normal range of motion.   Skin:     General: Skin is warm.      Findings: No rash.   Neurological:      Mental Status: She is alert and oriented to person, place, and time.      Motor: Motor function is intact.      Gait: Gait is intact.   Psychiatric:         Attention and Perception: Attention normal.         Mood and Affect: Mood normal.

## 2025-03-02 NOTE — PATIENT INSTRUCTIONS
Use Ofloxacin drops as prescribed  Avoid Q-Tip use  Tylenol/Ibuprofen for discomfort  Fluids  Change pillowcase daily  Avoid using ear buds until treatment finished. Clean ear buds often.  Avoid submerging head under water until finished with drops, or wear ear plugs when swimming.    Return to clinic if ear canal is too swollen for drops to be instilled for possible ear wic insertion.   Follow up with PCP in 3-5 days.  Proceed to the ER with worsening symptoms.    Swimmer's Ear   AMBULATORY CARE:   Swimmer's ear , also called otitis externa, is an infection in the outer ear canal. This canal goes from the outside of your ear to your eardrum. Swimmer's ear most often occurs when water remains in your ear after you swim. This creates a moist area for bacteria to grow. Scratches or damage from your fingers, cotton swabs, or other objects can also cause swimmer's ear.       Common signs and symptoms:   Ear pain    Red, swollen, itchy ear canal    Fluid or pus draining from your ear    A plugged ear and trouble hearing    Seek care immediately if:   You have severe ear pain.    You are suddenly not able to hear.    You have new swelling in your face, behind your ears, or in your neck.    You suddenly cannot move part of your face, or it feels numb.    Call your doctor if:   You have a fever.    Your symptoms get worse or do not go away, even after treatment.    You have questions or concerns about your condition or care.    Treatment for swimmer's ear  may include cleaning your outer ear canal first. This will help clean any ear wax, flaky skin, or other discharge. You may then need any of the following:  Medicines:      Ear drops  help fight infection and decrease redness and swelling.    Acetaminophen  decreases pain and fever. It is available without a doctor's order. Ask how much to take and how often to take it. Follow directions. Read the labels of all other medicines you are using to see if they also contain  acetaminophen, or ask your doctor or pharmacist. Acetaminophen can cause liver damage if not taken correctly. Do not use more than 4 grams (4,000 milligrams) total of acetaminophen in one day.     NSAIDs , such as ibuprofen, help decrease swelling, pain, and fever. This medicine is available with or without a doctor's order. NSAIDs can cause stomach bleeding or kidney problems in certain people. If you take blood thinner medicine, always ask your healthcare provider if NSAIDs are safe for you. Always read the medicine label and follow directions.    An ear wick  may be used if your ear canal is blocked. A wick (small tube) made of cotton or gauze is placed in your ear. The wick helps pull extra fluid out of your ear canal. Rhea also help draw medicine into your ear canal.    How to use ear drops:   Warm the bottle of ear drops in your hands  for a few minutes.    Lie down on your side with your infected ear facing up.  This will help the medicine travel completely through your ear canal.    Gently pull the ear up and back.  Carefully drip the correct number of ear drops into your ear. Have another person help you if possible.         For children younger than 3 years,  gently pull and hold the ear down and back.         For children older than 3 years,  gently pull and hold the ear up and back.         Stay in the same position  for 3 to 5 minutes to let the medicine soak in.    Prevent swimmer's ear:   Dry your ears completely after you swim or bathe.  Gently wipe your outer ear with a soft towel or cloth. Use ear plugs when you swim.    Do not put cotton swabs or other objects in your ears.  These can scratch or damage your ear. They can also push ear wax deeper in and irritate your ear.    Put cotton balls gently in your outer ear  when you apply hair spray, hair dye, or perfume.    Follow up with your doctor as directed:  Write down your questions so you remember to ask them during your visits.  © Copyright [employer] 2022 Information is for End User's use only and may not be sold, redistributed or otherwise used for commercial purposes. All illustrations and images included in CareNotes® are the copyrighted property of [employer] or [employer]  The above information is an  only. It is not intended as medical advice for individual conditions or treatments. Talk to your doctor, nurse or pharmacist before following any medical regimen to see if it is safe and effective for you.

## 2025-03-27 ENCOUNTER — TELEPHONE (OUTPATIENT)
Age: 25
End: 2025-03-27

## 2025-03-27 ENCOUNTER — OFFICE VISIT (OUTPATIENT)
Dept: FAMILY MEDICINE CLINIC | Facility: CLINIC | Age: 25
End: 2025-03-27
Payer: COMMERCIAL

## 2025-03-27 VITALS
TEMPERATURE: 98.3 F | OXYGEN SATURATION: 98 % | HEART RATE: 97 BPM | WEIGHT: 230.4 LBS | HEIGHT: 69 IN | DIASTOLIC BLOOD PRESSURE: 70 MMHG | SYSTOLIC BLOOD PRESSURE: 100 MMHG | BODY MASS INDEX: 34.13 KG/M2

## 2025-03-27 DIAGNOSIS — F90.0 ATTENTION DEFICIT HYPERACTIVITY DISORDER (ADHD), PREDOMINANTLY INATTENTIVE TYPE: Primary | ICD-10-CM

## 2025-03-27 PROCEDURE — 99214 OFFICE O/P EST MOD 30 MIN: CPT

## 2025-03-27 RX ORDER — LISDEXAMFETAMINE DIMESYLATE 30 MG/1
30 CAPSULE ORAL EVERY MORNING
Qty: 30 CAPSULE | Refills: 0 | Status: SHIPPED | OUTPATIENT
Start: 2025-03-27

## 2025-03-27 NOTE — TELEPHONE ENCOUNTER
Pt called in stating she went to [facility] to  her script for: VYVANSE - but pharmacy informed her they hadn't received script yet.     Reviewed chart, script was sent just a few minutes ago.       E-Prescribing Status    Outpatient Medication Detail    lisdexamfetamine (Vyvanse) 30 MG capsule   Sig: Take 1 capsule (30 mg total) by mouth every morning Max Daily Amount: 30 mg   Sent to pharmacy as: Lisdexamfetamine Dimesylate 30 MG Oral Capsule (Vyvanse)   Class: Normal   Earliest Fill Date: 3/27/2025   Route: Oral   E-Prescribing Status: Receipt confirmed by pharmacy (3/27/2025  4:12 PM EDT)     Pt understood & will follow-up with pharmacy.

## 2025-03-27 NOTE — ASSESSMENT & PLAN NOTE
Patient presents to discuss worsening symptoms of ADHD. Continues with inability to focus, trouble completing tasks, inattentiveness, and trouble sleeping due to hyperactivity which she feels is starting to affect her work.   She has tried both Strattera and Wellbutrin in the past with no relief.   She was evaluated by Neuropsychiatry and was diagnosed with combined ADHD back in 10/2024, evaluation is scanned into media.   We discussed different stimulant medication options in depth today. We discussed that they are a controlled substance and have addictive potential and possible side effects of weight loss, palpitations, headache, dizziness.   She would like to trial a stimulant medication. Therefore, sent in Vyvanse 30 mg QD. Filled out controlled substance agreement today and will complete UDS at next visit.   Follow up in one month for re-evaluation, to call sooner with any questions or concerns.     Orders:    lisdexamfetamine (Vyvanse) 30 MG capsule; Take 1 capsule (30 mg total) by mouth every morning Max Daily Amount: 30 mg

## 2025-03-27 NOTE — PROGRESS NOTES
Name: Mónica Godoy      : 2000      MRN: [id number]  Encounter Provider: Kimber Quesada PA-C  Encounter Date: 3/27/2025   Encounter department: [facility]  :  Assessment & Plan  Attention deficit hyperactivity disorder (ADHD), predominantly inattentive type  Patient presents to discuss worsening symptoms of ADHD. Continues with inability to focus, trouble completing tasks, inattentiveness, and trouble sleeping due to hyperactivity which she feels is starting to affect her work.   She has tried both Strattera and Wellbutrin in the past with no relief.   She was evaluated by Neuropsychiatry and was diagnosed with combined ADHD back in 10/2024, evaluation is scanned into media.   We discussed different stimulant medication options in depth today. We discussed that they are a controlled substance and have addictive potential and possible side effects of weight loss, palpitations, headache, dizziness.   She would like to trial a stimulant medication. Therefore, sent in Vyvanse 30 mg QD. Filled out controlled substance agreement today and will complete UDS at next visit.   Follow up in one month for re-evaluation, to call sooner with any questions or concerns.     Orders:    lisdexamfetamine (Vyvanse) 30 MG capsule; Take 1 capsule (30 mg total) by mouth every morning Max Daily Amount: 30 mg           History of Present Illness   CC: ADHD    Patient presents to discuss ADHD.   Patient was previously following with psychiatry, and was sent to neuropsychiatry for ADHD testing. Scanned into media from 10/2024, her ADHD evaluation was positive and she was diagnosed with combined inattentive-hyperactive ADHD.   Patient was initially started on Strattera for her symptoms, was on the medication for a few months but then stopped due to it not helping. Recently, was started on wellbutrin however again felt it was not helping so stopped.   She is interested in trial of stimulant medication.   No  "addiction history.   No cardiac history.       Review of Systems   Respiratory:  Negative for chest tightness, shortness of breath and wheezing.    Cardiovascular:  Negative for chest pain and palpitations.   Neurological:  Negative for dizziness, light-headedness and headaches.       Objective   /70   Pulse 97   Temp 98.3 °F (36.8 °C)   Ht 5' 9\" (1.753 m)   Wt 105 kg (230 lb 6.4 oz)   SpO2 98%   BMI 34.02 kg/m²      Physical Exam  Vitals reviewed.   Constitutional:       General: She is not in acute distress.     Appearance: Normal appearance. She is not ill-appearing or diaphoretic.   HENT:      Head: Normocephalic and atraumatic.      Right Ear: External ear normal.      Left Ear: External ear normal.      Nose: Nose normal.      Mouth/Throat:      Mouth: Mucous membranes are moist.   Eyes:      General:         Right eye: No discharge.         Left eye: No discharge.      Conjunctiva/sclera: Conjunctivae normal.   Cardiovascular:      Rate and Rhythm: Normal rate and regular rhythm.      Heart sounds: Normal heart sounds. No murmur heard.  Pulmonary:      Effort: Pulmonary effort is normal. No respiratory distress.      Breath sounds: Normal breath sounds. No wheezing, rhonchi or rales.   Skin:     General: Skin is warm.   Neurological:      General: No focal deficit present.      Mental Status: She is alert.      Gait: Gait normal.   Psychiatric:         Mood and Affect: Mood normal.         "

## 2025-04-18 DIAGNOSIS — F41.0 GENERALIZED ANXIETY DISORDER WITH PANIC ATTACKS: ICD-10-CM

## 2025-04-18 DIAGNOSIS — F41.1 GENERALIZED ANXIETY DISORDER WITH PANIC ATTACKS: ICD-10-CM

## 2025-04-18 RX ORDER — ESCITALOPRAM OXALATE 20 MG/1
20 TABLET ORAL DAILY
Qty: 90 TABLET | Refills: 1 | Status: SHIPPED | OUTPATIENT
Start: 2025-04-18

## 2025-04-28 ENCOUNTER — OFFICE VISIT (OUTPATIENT)
Dept: FAMILY MEDICINE CLINIC | Facility: CLINIC | Age: 25
End: 2025-04-28
Payer: COMMERCIAL

## 2025-04-28 VITALS
SYSTOLIC BLOOD PRESSURE: 128 MMHG | DIASTOLIC BLOOD PRESSURE: 80 MMHG | HEART RATE: 94 BPM | BODY MASS INDEX: 34.51 KG/M2 | OXYGEN SATURATION: 98 % | HEIGHT: 69 IN | TEMPERATURE: 98.7 F | WEIGHT: 233 LBS

## 2025-04-28 DIAGNOSIS — Z79.899 OTHER LONG TERM (CURRENT) DRUG THERAPY: ICD-10-CM

## 2025-04-28 DIAGNOSIS — F90.0 ATTENTION DEFICIT HYPERACTIVITY DISORDER (ADHD), PREDOMINANTLY INATTENTIVE TYPE: Primary | ICD-10-CM

## 2025-04-28 PROCEDURE — 99213 OFFICE O/P EST LOW 20 MIN: CPT

## 2025-04-28 NOTE — PROGRESS NOTES
Name: Mónica Godoy      : 2000      MRN: [id number]  Encounter Provider: Kimber Quesada PA-C  Encounter Date: 2025   Encounter department: [facility]  :  Assessment & Plan  Attention deficit hyperactivity disorder (ADHD), predominantly inattentive type  Patient presents for four week follow up.   Reports significant improvement in symptoms since starting Vyvanse 30 mg QD at last visit. Able to focus more at work and less overwhelmed. No side effects on medication.   Therefore, we will continue current Vyvanse 30 mg daily.   Controlled substance agreement on file.   UDS completed today.   Will follow up in three months. Sooner as needed.   To call with any questions or concerns.        Other long term (current) drug therapy    Orders:    Urine Drug Screen    Methylphenidate    Fentanyl with Confirmation    Buprenorphine w/ Naloxone    Naltrexone    Gabapentin    Pregabalin    Synthetic Stimulants    [facility] All Prescribed Medications           History of Present Illness   CC: ADHD     Patient presents for ADHD follow up.   At last visit four weeks ago, patient was started on Vyvanse 30 mg daily for treatment of ADHD after she went through neuropsychological testing that confirmed diagnosis of ADHD. She has tried multiple non-stimulant medication (strattera, wellbutrin) with minimal improvement in symptoms    Patient reports since starting the Vyvanse she is feeling a lot better. Reports she is able to focus more at work and prioritize tasks better. Feels she is more motivated and less overwhelmed at work since starting the medication.   She denies any side effects. Denies chest pain, sob, dizziness, headaches, palpitations. Reports she feels good without any complaints. She would like to remain on current medication.       Review of Systems   Respiratory:  Negative for chest tightness, shortness of breath and wheezing.    Cardiovascular:  Negative for chest pain and palpitations.  "  Neurological:  Negative for dizziness, light-headedness and headaches.       Objective   /80   Pulse 94   Temp 98.7 °F (37.1 °C)   Ht 5' 9\" (1.753 m)   Wt 106 kg (233 lb)   SpO2 98%   BMI 34.41 kg/m²      Physical Exam  Vitals reviewed.   Constitutional:       General: She is not in acute distress.     Appearance: Normal appearance. She is not ill-appearing or diaphoretic.   HENT:      Head: Normocephalic and atraumatic.      Right Ear: External ear normal.      Left Ear: External ear normal.      Nose: Nose normal.      Mouth/Throat:      Mouth: Mucous membranes are moist.   Eyes:      General:         Right eye: No discharge.         Left eye: No discharge.      Conjunctiva/sclera: Conjunctivae normal.   Cardiovascular:      Rate and Rhythm: Normal rate and regular rhythm.      Heart sounds: Normal heart sounds. No murmur heard.  Pulmonary:      Effort: Pulmonary effort is normal. No respiratory distress.      Breath sounds: Normal breath sounds. No wheezing, rhonchi or rales.   Skin:     General: Skin is warm.   Neurological:      General: No focal deficit present.      Mental Status: She is alert.      Gait: Gait normal.   Psychiatric:         Mood and Affect: Mood normal.         "

## 2025-04-28 NOTE — ASSESSMENT & PLAN NOTE
Patient presents for four week follow up.   Reports significant improvement in symptoms since starting Vyvanse 30 mg QD at last visit. Able to focus more at work and less overwhelmed. No side effects on medication.   Therefore, we will continue current Vyvanse 30 mg daily.   Controlled substance agreement on file.   UDS completed today.   Will follow up in three months. Sooner as needed.   To call with any questions or concerns.

## 2025-06-16 DIAGNOSIS — F41.0 GENERALIZED ANXIETY DISORDER WITH PANIC ATTACKS: ICD-10-CM

## 2025-06-16 DIAGNOSIS — F41.1 GENERALIZED ANXIETY DISORDER WITH PANIC ATTACKS: ICD-10-CM

## 2025-06-16 RX ORDER — ESCITALOPRAM OXALATE 20 MG/1
20 TABLET ORAL DAILY
Qty: 90 TABLET | Refills: 1 | Status: SHIPPED | OUTPATIENT
Start: 2025-06-16